# Patient Record
Sex: MALE | Race: OTHER | HISPANIC OR LATINO
[De-identification: names, ages, dates, MRNs, and addresses within clinical notes are randomized per-mention and may not be internally consistent; named-entity substitution may affect disease eponyms.]

---

## 2017-06-29 ENCOUNTER — APPOINTMENT (OUTPATIENT)
Dept: PULMONOLOGY | Facility: CLINIC | Age: 52
End: 2017-06-29

## 2017-06-29 VITALS
HEIGHT: 66 IN | HEART RATE: 71 BPM | DIASTOLIC BLOOD PRESSURE: 80 MMHG | WEIGHT: 243 LBS | BODY MASS INDEX: 39.05 KG/M2 | SYSTOLIC BLOOD PRESSURE: 140 MMHG | OXYGEN SATURATION: 96 %

## 2017-06-29 DIAGNOSIS — M54.31 SCIATICA, RIGHT SIDE: ICD-10-CM

## 2017-06-29 DIAGNOSIS — G47.33 OBSTRUCTIVE SLEEP APNEA (ADULT) (PEDIATRIC): ICD-10-CM

## 2017-06-29 DIAGNOSIS — E66.9 OBESITY, UNSPECIFIED: ICD-10-CM

## 2020-04-16 ENCOUNTER — INPATIENT (INPATIENT)
Facility: HOSPITAL | Age: 55
LOS: 5 days | Discharge: HOME | DRG: 177 | End: 2020-04-22
Attending: INTERNAL MEDICINE | Admitting: HOSPITALIST
Payer: MEDICAID

## 2020-04-16 VITALS
HEIGHT: 66 IN | SYSTOLIC BLOOD PRESSURE: 117 MMHG | RESPIRATION RATE: 20 BRPM | WEIGHT: 250 LBS | TEMPERATURE: 99 F | OXYGEN SATURATION: 93 % | HEART RATE: 81 BPM | DIASTOLIC BLOOD PRESSURE: 69 MMHG

## 2020-04-16 DIAGNOSIS — U07.1 COVID-19: ICD-10-CM

## 2020-04-16 DIAGNOSIS — R09.02 HYPOXEMIA: ICD-10-CM

## 2020-04-16 DIAGNOSIS — E87.1 HYPO-OSMOLALITY AND HYPONATREMIA: ICD-10-CM

## 2020-04-16 LAB
ALBUMIN SERPL ELPH-MCNC: 3.5 G/DL — SIGNIFICANT CHANGE UP (ref 3.3–5.2)
ALP SERPL-CCNC: 67 U/L — SIGNIFICANT CHANGE UP (ref 40–120)
ALT FLD-CCNC: 32 U/L — SIGNIFICANT CHANGE UP
ANION GAP SERPL CALC-SCNC: 12 MMOL/L — SIGNIFICANT CHANGE UP (ref 5–17)
AST SERPL-CCNC: 37 U/L — SIGNIFICANT CHANGE UP
BASOPHILS # BLD AUTO: 0.01 K/UL — SIGNIFICANT CHANGE UP (ref 0–0.2)
BASOPHILS NFR BLD AUTO: 0.2 % — SIGNIFICANT CHANGE UP (ref 0–2)
BILIRUB SERPL-MCNC: 0.5 MG/DL — SIGNIFICANT CHANGE UP (ref 0.4–2)
BUN SERPL-MCNC: 14 MG/DL — SIGNIFICANT CHANGE UP (ref 8–20)
CALCIUM SERPL-MCNC: 8.3 MG/DL — LOW (ref 8.6–10.2)
CHLORIDE SERPL-SCNC: 90 MMOL/L — LOW (ref 98–107)
CO2 SERPL-SCNC: 26 MMOL/L — SIGNIFICANT CHANGE UP (ref 22–29)
CREAT SERPL-MCNC: 0.71 MG/DL — SIGNIFICANT CHANGE UP (ref 0.5–1.3)
CRP SERPL-MCNC: 8.12 MG/DL — HIGH (ref 0–0.4)
EOSINOPHIL # BLD AUTO: 0 K/UL — SIGNIFICANT CHANGE UP (ref 0–0.5)
EOSINOPHIL NFR BLD AUTO: 0 % — SIGNIFICANT CHANGE UP (ref 0–6)
FERRITIN SERPL-MCNC: 658 NG/ML — HIGH (ref 30–400)
GLUCOSE SERPL-MCNC: 106 MG/DL — HIGH (ref 70–99)
HCT VFR BLD CALC: 38.5 % — LOW (ref 39–50)
HGB BLD-MCNC: 13.4 G/DL — SIGNIFICANT CHANGE UP (ref 13–17)
IMM GRANULOCYTES NFR BLD AUTO: 0.3 % — SIGNIFICANT CHANGE UP (ref 0–1.5)
LDH SERPL L TO P-CCNC: 329 U/L — HIGH (ref 98–192)
LYMPHOCYTES # BLD AUTO: 1.25 K/UL — SIGNIFICANT CHANGE UP (ref 1–3.3)
LYMPHOCYTES # BLD AUTO: 19.8 % — SIGNIFICANT CHANGE UP (ref 13–44)
MCHC RBC-ENTMCNC: 29.6 PG — SIGNIFICANT CHANGE UP (ref 27–34)
MCHC RBC-ENTMCNC: 34.8 GM/DL — SIGNIFICANT CHANGE UP (ref 32–36)
MCV RBC AUTO: 85.2 FL — SIGNIFICANT CHANGE UP (ref 80–100)
MONOCYTES # BLD AUTO: 0.5 K/UL — SIGNIFICANT CHANGE UP (ref 0–0.9)
MONOCYTES NFR BLD AUTO: 7.9 % — SIGNIFICANT CHANGE UP (ref 2–14)
NEUTROPHILS # BLD AUTO: 4.53 K/UL — SIGNIFICANT CHANGE UP (ref 1.8–7.4)
NEUTROPHILS NFR BLD AUTO: 71.8 % — SIGNIFICANT CHANGE UP (ref 43–77)
PLATELET # BLD AUTO: 223 K/UL — SIGNIFICANT CHANGE UP (ref 150–400)
POTASSIUM SERPL-MCNC: 4.1 MMOL/L — SIGNIFICANT CHANGE UP (ref 3.5–5.3)
POTASSIUM SERPL-SCNC: 4.1 MMOL/L — SIGNIFICANT CHANGE UP (ref 3.5–5.3)
PROCALCITONIN SERPL-MCNC: 0.12 NG/ML — HIGH (ref 0.02–0.1)
PROT SERPL-MCNC: 6.7 G/DL — SIGNIFICANT CHANGE UP (ref 6.6–8.7)
RBC # BLD: 4.52 M/UL — SIGNIFICANT CHANGE UP (ref 4.2–5.8)
RBC # FLD: 12.7 % — SIGNIFICANT CHANGE UP (ref 10.3–14.5)
SARS-COV-2 RNA SPEC QL NAA+PROBE: DETECTED
SODIUM SERPL-SCNC: 128 MMOL/L — LOW (ref 135–145)
WBC # BLD: 6.31 K/UL — SIGNIFICANT CHANGE UP (ref 3.8–10.5)
WBC # FLD AUTO: 6.31 K/UL — SIGNIFICANT CHANGE UP (ref 3.8–10.5)

## 2020-04-16 PROCEDURE — 93010 ELECTROCARDIOGRAM REPORT: CPT

## 2020-04-16 PROCEDURE — 71045 X-RAY EXAM CHEST 1 VIEW: CPT | Mod: 26

## 2020-04-16 PROCEDURE — 99223 1ST HOSP IP/OBS HIGH 75: CPT

## 2020-04-16 PROCEDURE — 99285 EMERGENCY DEPT VISIT HI MDM: CPT

## 2020-04-16 RX ORDER — ALBUTEROL 90 UG/1
2 AEROSOL, METERED ORAL EVERY 4 HOURS
Refills: 0 | Status: DISCONTINUED | OUTPATIENT
Start: 2020-04-16 | End: 2020-04-22

## 2020-04-16 RX ORDER — HYDROXYCHLOROQUINE SULFATE 200 MG
800 TABLET ORAL EVERY 24 HOURS
Refills: 0 | Status: COMPLETED | OUTPATIENT
Start: 2020-04-16 | End: 2020-04-16

## 2020-04-16 RX ORDER — HYDROXYCHLOROQUINE SULFATE 200 MG
TABLET ORAL
Refills: 0 | Status: COMPLETED | OUTPATIENT
Start: 2020-04-16 | End: 2020-04-20

## 2020-04-16 RX ORDER — ENOXAPARIN SODIUM 100 MG/ML
40 INJECTION SUBCUTANEOUS EVERY 12 HOURS
Refills: 0 | Status: DISCONTINUED | OUTPATIENT
Start: 2020-04-16 | End: 2020-04-22

## 2020-04-16 RX ORDER — SODIUM CHLORIDE 9 MG/ML
3 INJECTION INTRAMUSCULAR; INTRAVENOUS; SUBCUTANEOUS EVERY 8 HOURS
Refills: 0 | Status: DISCONTINUED | OUTPATIENT
Start: 2020-04-16 | End: 2020-04-22

## 2020-04-16 RX ORDER — LANOLIN ALCOHOL/MO/W.PET/CERES
3 CREAM (GRAM) TOPICAL AT BEDTIME
Refills: 0 | Status: DISCONTINUED | OUTPATIENT
Start: 2020-04-16 | End: 2020-04-22

## 2020-04-16 RX ORDER — ONDANSETRON 8 MG/1
4 TABLET, FILM COATED ORAL EVERY 6 HOURS
Refills: 0 | Status: DISCONTINUED | OUTPATIENT
Start: 2020-04-16 | End: 2020-04-22

## 2020-04-16 RX ORDER — SODIUM CHLORIDE 9 MG/ML
1000 INJECTION INTRAMUSCULAR; INTRAVENOUS; SUBCUTANEOUS
Refills: 0 | Status: DISCONTINUED | OUTPATIENT
Start: 2020-04-16 | End: 2020-04-22

## 2020-04-16 RX ORDER — HYDROXYCHLOROQUINE SULFATE 200 MG
400 TABLET ORAL EVERY 24 HOURS
Refills: 0 | Status: COMPLETED | OUTPATIENT
Start: 2020-04-17 | End: 2020-04-20

## 2020-04-16 RX ORDER — ACETAMINOPHEN 500 MG
650 TABLET ORAL EVERY 4 HOURS
Refills: 0 | Status: DISCONTINUED | OUTPATIENT
Start: 2020-04-16 | End: 2020-04-22

## 2020-04-16 RX ADMIN — SODIUM CHLORIDE 3 MILLILITER(S): 9 INJECTION INTRAMUSCULAR; INTRAVENOUS; SUBCUTANEOUS at 21:48

## 2020-04-16 NOTE — H&P ADULT - ASSESSMENT
54 y/o male with acute hypoxemic respiratory failure due to COVID viral PNA, hypovolemic hyponatremia, Dehydration

## 2020-04-16 NOTE — ED PROVIDER NOTE - PATIENT PORTAL LINK FT
You can access the FollowMyHealth Patient Portal offered by [facility] by registering at the following website: [email or web address]. By joining [facility]’s FollowMyHealth portal, you will also be able to view your health information using other applications (apps) compatible with our system.

## 2020-04-16 NOTE — ED PROVIDER NOTE - NS ED ROS FT
+ fever/chills, No photophobia/eye pain/changes in vision, No ear pain/sore throat/dysphagia, No chest pain/palpitations, +SOB/cough no wheeze/stridor, No abdominal pain, No N/V/D, no dysuria/frequency/discharge, No neck/back pain, no rash, no changes in neurological status/function.

## 2020-04-16 NOTE — ED ADULT NURSE NOTE - NSIMPLEMENTINTERV_GEN_ALL_ED
Implemented All Universal Safety Interventions:  [place] to call system. Call bell, personal items and telephone within reach. Instruct patient to call for assistance. Room bathroom lighting operational. Non-slip footwear when patient is off stretcher. Physically safe environment: no spills, clutter or unnecessary equipment. Stretcher in lowest position, wheels locked, appropriate side rails in place.

## 2020-04-16 NOTE — H&P ADULT - PROBLEM SELECTOR PLAN 3
Likely hypovolemic hyponatremia based on increased insensible fluid loss from fever/COVD PNA. Avoid excess fluids with risk of ARDS. Place on NS for 12 hours and repeat BMP in AM

## 2020-04-16 NOTE — ED PROVIDER NOTE - CLINICAL SUMMARY MEDICAL DECISION MAKING FREE TEXT BOX
labs and imaging reviewed.  Pt not hypoxic in ER.  Sat >92% at rest and on exertion stays above 92%.  Pt not requiring oxygen. Pt well-appearing with no acute distress. Pt instructed to return for worsening sob, cp, or any other concerns.  Pt given a copy of all results and instructed to f/up with pcp regarding any abnormal results. labs and imaging reviewed.  Pt hypoxic to 90% and mildly tachypneic sitting in bed. will admit for further management. labs and imaging reviewed.  Pt hypoxic to 90% and mildly tachypneic sitting in bed. will admit for further management.    Case d/w Dr. Blandon who will admit for further management.

## 2020-04-16 NOTE — ED ADULT NURSE NOTE - OBJECTIVE STATEMENT
pt a&ox3 c/o difficulty breathing worsening yesterday. +body aches. +covid19 contacts pt wife and mother-in-law are both positive. isolation maintained, saO2 93% RA. pt able to ambulate with steady gait noted. RR 20 and no s/s labored breathing.

## 2020-04-16 NOTE — ED PROVIDER NOTE - NSFOLLOWUPINSTRUCTIONS_ED_ALL_ED_FT
KAYLA TODAS LAS INSTRUCCIONES ADJUNTAS PARA CORONAVIRUS INMEDIATAMENTE   - Se le realizaron pruebas para detectar COVID-19 (Coronavirus) javi toney visita al Departamento de Emergencias.   - No vuelva al trabajo/escuela/áreas públicas/transporte público hasta que haya dado negativo para COVID-19.  - Los resultados estarán disponibles en 5-7 días. Autocuarentena hasta que los resultados de COVID-19 estén disponibles.  - Supervise deandre síntomas utilizando el rastreador de síntomas.  - Practicar el distanciamiento social y evitar el contacto con los demás. Evite compartir artículos personales para el hogar.   - Practicar la higiene adecuada de las michael. Desinfectar superficies con frecuencia. Cúbrase la tos y estornude.   - Manténgase hidratado.      BUSCA ATENCIÓN MÉDICA INMEDIATA SI TIENES CUALQUIERA DE LOS SIGUIENTES SÍNTOMAS  **Busca atención médica inmediata si desarrollas nuevos/empeoramiento, signos/síntomas o preocupaciones, incluyendo, thao no limitado a dificultad para respirar, dificultad para respirar, dolor en el pecho, confusión, debilidad severa.**    Si dante que está experimentando michelle emergencia médica llame al 9-1-1.    READ ALL ATTACHED INSTRUCTIONS FOR CORONAVIRUS IMMEDIATELY   - You were tested for COVID-19 (Coronavirus) during your visit to Emergency Department.   - Do not return to work/school/public areas/public transit until you have tested negative for COVID-19.  - Results will be available in 5-7 days. Self quarantine until COVID-19 results available.  - Monitor your symptoms using symptom tracker.  - Practice social distancing and avoid contact with others. Avoid sharing personal household items.   - Practice proper hand hygiene. Disinfect surfaces frequently. Cover your coughs and sneezes.   - Stay hydrated.      SEEK IMMEDIATE MEDICAL CARE IF YOU HAVE ANY OF THE FOLLOWING SYMPTOMS  **Seek immediate medicate attention if you develop new/worsening, signs/symptoms or concerns including, but not limited to shortness of breath, difficulty breathing, chest pain, confusion, severe weakness.**    If you believe you are experiencing a medical emergency call 9-1-1.

## 2020-04-16 NOTE — ED ADULT NURSE REASSESSMENT NOTE - NS ED NURSE REASSESS COMMENT FT1
report given to 6T RN at this time, pt remains on 2L 02 via nasal cannula. AOX3, no distress. even and unlabored resps present.

## 2020-04-16 NOTE — H&P ADULT - HISTORY OF PRESENT ILLNESS
Pt is a 54 yo M co fever, cough, body aches and sob. Pt states that the symptoms started 1 week ago. Pt states that he has had worsening sob. Pt states that he sleeps on his belly at night and last night woke up short of breath and checked his pulse ox and it was 88%. Pt states that his wife and mother-in-law were both covid positive.  no cp. no n/v. no diarrhea. no other complaints. Denies skin rash, leg edema, no hx of lung disease. In ED noted to have CXR with b/l intersitial infiltrates, COVID swab positive.

## 2020-04-16 NOTE — H&P ADULT - PROBLEM SELECTOR PLAN 1
Admit to medical bed with Q 4 hour pulse ox, Plaquenil taper, spirometer supplemental 02, prone as tolerated, MICU consult if unable to maintain saturation on NRB, prn tylenol, trend fever, avoid unnecessary blood draws. DVT-P, OOB ambulate

## 2020-04-16 NOTE — ED PROVIDER NOTE - OBJECTIVE STATEMENT
Pt is a 54 yo M co fever, cough, body aches and sob. Pt states that the symptoms started 1 week ago. Pt states that he has had worsening sob. Pt states that he sleeps on his belly at night and last night woke up short of breath and checked his pulse ox and it was 88%. Pt states that his wife and mother-in-law were both covid positive.  no cp. no n/v. no diarrhea. no other complaints.

## 2020-04-16 NOTE — ED ADULT NURSE REASSESSMENT NOTE - NS ED NURSE REASSESS COMMENT FT1
upon attempt to DC, pt with 02 sat below 90%, pt stating he si more SOB than earlier today. ER MD aware and at bedside for re-eval.

## 2020-04-17 LAB
ANION GAP SERPL CALC-SCNC: 12 MMOL/L — SIGNIFICANT CHANGE UP (ref 5–17)
BUN SERPL-MCNC: 11 MG/DL — SIGNIFICANT CHANGE UP (ref 8–20)
CALCIUM SERPL-MCNC: 8.1 MG/DL — LOW (ref 8.6–10.2)
CHLORIDE SERPL-SCNC: 88 MMOL/L — LOW (ref 98–107)
CO2 SERPL-SCNC: 26 MMOL/L — SIGNIFICANT CHANGE UP (ref 22–29)
CREAT SERPL-MCNC: 0.66 MG/DL — SIGNIFICANT CHANGE UP (ref 0.5–1.3)
GLUCOSE SERPL-MCNC: 90 MG/DL — SIGNIFICANT CHANGE UP (ref 70–99)
HCV AB S/CO SERPL IA: 0.09 S/CO — SIGNIFICANT CHANGE UP (ref 0–0.99)
HCV AB SERPL-IMP: SIGNIFICANT CHANGE UP
POTASSIUM SERPL-MCNC: 3.8 MMOL/L — SIGNIFICANT CHANGE UP (ref 3.5–5.3)
POTASSIUM SERPL-SCNC: 3.8 MMOL/L — SIGNIFICANT CHANGE UP (ref 3.5–5.3)
SODIUM SERPL-SCNC: 126 MMOL/L — LOW (ref 135–145)

## 2020-04-17 RX ADMIN — Medication 400 MILLIGRAM(S): at 21:41

## 2020-04-17 RX ADMIN — SODIUM CHLORIDE 3 MILLILITER(S): 9 INJECTION INTRAMUSCULAR; INTRAVENOUS; SUBCUTANEOUS at 04:07

## 2020-04-17 RX ADMIN — SODIUM CHLORIDE 3 MILLILITER(S): 9 INJECTION INTRAMUSCULAR; INTRAVENOUS; SUBCUTANEOUS at 21:40

## 2020-04-17 RX ADMIN — SODIUM CHLORIDE 125 MILLILITER(S): 9 INJECTION INTRAMUSCULAR; INTRAVENOUS; SUBCUTANEOUS at 00:07

## 2020-04-17 RX ADMIN — ENOXAPARIN SODIUM 40 MILLIGRAM(S): 100 INJECTION SUBCUTANEOUS at 17:24

## 2020-04-17 RX ADMIN — Medication 800 MILLIGRAM(S): at 00:07

## 2020-04-17 RX ADMIN — ENOXAPARIN SODIUM 40 MILLIGRAM(S): 100 INJECTION SUBCUTANEOUS at 04:08

## 2020-04-17 RX ADMIN — SODIUM CHLORIDE 3 MILLILITER(S): 9 INJECTION INTRAMUSCULAR; INTRAVENOUS; SUBCUTANEOUS at 13:01

## 2020-04-17 NOTE — PROGRESS NOTE ADULT - ASSESSMENT
Beginning to respond to medications:Receiving Lovenox and Plaquenil.    Plan: To continue current meds.

## 2020-04-17 NOTE — PROGRESS NOTE ADULT - SUBJECTIVE AND OBJECTIVE BOX
Patient seen and examined and EMR reviewed.   Admitted with Fever, cough, SOB.   Patient is able to give good history.    COVID 19 posiyive with bilateral infiltrates on CxR.  O2 sat is good on nasal cannula.

## 2020-04-18 PROCEDURE — 71045 X-RAY EXAM CHEST 1 VIEW: CPT | Mod: 26

## 2020-04-18 RX ADMIN — SODIUM CHLORIDE 3 MILLILITER(S): 9 INJECTION INTRAMUSCULAR; INTRAVENOUS; SUBCUTANEOUS at 11:22

## 2020-04-18 RX ADMIN — SODIUM CHLORIDE 3 MILLILITER(S): 9 INJECTION INTRAMUSCULAR; INTRAVENOUS; SUBCUTANEOUS at 19:48

## 2020-04-18 RX ADMIN — ENOXAPARIN SODIUM 40 MILLIGRAM(S): 100 INJECTION SUBCUTANEOUS at 04:31

## 2020-04-18 RX ADMIN — Medication 400 MILLIGRAM(S): at 20:17

## 2020-04-18 RX ADMIN — ENOXAPARIN SODIUM 40 MILLIGRAM(S): 100 INJECTION SUBCUTANEOUS at 18:30

## 2020-04-18 RX ADMIN — Medication 650 MILLIGRAM(S): at 17:15

## 2020-04-18 RX ADMIN — SODIUM CHLORIDE 3 MILLILITER(S): 9 INJECTION INTRAMUSCULAR; INTRAVENOUS; SUBCUTANEOUS at 04:24

## 2020-04-18 NOTE — PROGRESS NOTE ADULT - SUBJECTIVE AND OBJECTIVE BOX
Recent temp above 103. Slightly worse hyponatremia.   NP progress note appreciated. I agree with the orders and Plan as outlined.   Repeat labs for tomorrow.

## 2020-04-18 NOTE — PROGRESS NOTE ADULT - ASSESSMENT
56 y/o male with acute hypoxemic respiratory failure due to COVID viral PNA, & hyponatremia    1. Acute hypoxemic respiratory failure due to COVID-19 PNA.   Bilateral patchy infiltrates on admission CXR. Known COVID positive contacts in home. Complains of feeling mildly short of breath. O2 sats stable on 2LNC supplemental O2. Inflammatory markers elevated on admission.     -Q4H pulse ox checks  -Wean O2 as tolerated  -IS Q1H while awake   -Continue Plaquenil taper, QTc 417 on admission   -Prone as tolerated   -Tylenol PRN fever/pain.  -Continue to trend inflammatory markers if resp status worsens          2. Hyponatremia   Mild. Asymptomatic. Serum Na 128 -->126 s/p IV fluid overnight. Has had intermittent fevers. Does not appear to be on home diuretics. Renal fx WNL.    -Will check urine studies (Na & Osmo)  -I&O Q4H  -BMP in AM    DVT Prophy - Lovenox BID

## 2020-04-18 NOTE — PROGRESS NOTE ADULT - SUBJECTIVE AND OBJECTIVE BOX
Medicine Progress Note    Patient is a 55y old  Male who presents with a chief complaint of COVID PNA  Hypoxia  Hyponatremia (17 Apr 2020 19:46)      SUBJECTIVE / OVERNIGHT EVENTS:      MEDICATIONS  (STANDING):  enoxaparin Injectable 40 milliGRAM(s) SubCutaneous every 12 hours  hydroxychloroquine   Oral   hydroxychloroquine 400 milliGRAM(s) Oral every 24 hours  sodium chloride 0.9% lock flush 3 milliLiter(s) IV Push every 8 hours  sodium chloride 0.9%. 1000 milliLiter(s) (125 mL/Hr) IV Continuous <Continuous>    MEDICATIONS  (PRN):  acetaminophen   Tablet .. 650 milliGRAM(s) Oral every 4 hours PRN Temp greater or equal to 38.5C (101.3F)  ALBUTerol    90 MICROgram(s) HFA Inhaler 2 Puff(s) Inhalation every 4 hours PRN Shortness of Breath and/or Wheezing  benzonatate 100 milliGRAM(s) Oral three times a day PRN Cough  melatonin 3 milliGRAM(s) Oral at bedtime PRN Insomnia  ondansetron Injectable 4 milliGRAM(s) IV Push every 6 hours PRN Nausea and/or Vomiting      I&O's Summary    17 Apr 2020 07:01  -  18 Apr 2020 07:00  --------------------------------------------------------  IN: 625 mL / OUT: 0 mL / NET: 625 mL        PHYSICAL EXAM:  Vital Signs Last 24 Hrs  T(C): 39.9 (18 Apr 2020 16:32), Max: 39.9 (18 Apr 2020 16:32)  T(F): 103.8 (18 Apr 2020 16:32), Max: 103.8 (18 Apr 2020 16:32)  HR: 75 (18 Apr 2020 16:32) (75 - 77)  BP: 127/70 (18 Apr 2020 16:32) (127/70 - 139/69)  BP(mean): --  RR: 19 (18 Apr 2020 16:32) (18 - 19)  SpO2: 93% (18 Apr 2020 16:32) (93% - 95%)    CONSTITUTIONAL: NAD, well-developed, well-groomed  RESPIRATORY: Normal respiratory effort;   CARDIOVASCULAR: Regular rate and rhythm, No lower extremity edema; Peripheral pulses are 2+ bilaterally  ABDOMEN: Nontender to palpation, normoactive bowel sounds, no rebound/guarding; No hepatosplenomegaly  PSYCH: A+O to person, place, and time; affect appropriate  SKIN: No rashes; no palpable lesions    LABS:                        13.4   6.31  )-----------( 223      ( 16 Apr 2020 17:52 )             38.5     04-17    126<L>  |  88<L>  |  11.0  ----------------------------<  90  3.8   |  26.0  |  0.66    Ca    8.1<L>      17 Apr 2020 08:58    TPro  6.7  /  Alb  3.5  /  TBili  0.5  /  DBili  x   /  AST  37  /  ALT  32  /  AlkPhos  67  04-16      COVID-19 PCR: Detected (16 Apr 2020 17:55) Medicine Progress Note    Patient is a 55y old  Male who presents with a chief complaint of COVID PNA  Hypoxia  Hyponatremia (17 Apr 2020 19:46)      SUBJECTIVE / OVERNIGHT EVENTS: Stable O2 requirements overnight. Continue Plaquenil. Serum Na 126 this AM, sending urine studies, strict I&O      MEDICATIONS  (STANDING):  enoxaparin Injectable 40 milliGRAM(s) SubCutaneous every 12 hours  hydroxychloroquine   Oral   hydroxychloroquine 400 milliGRAM(s) Oral every 24 hours  sodium chloride 0.9% lock flush 3 milliLiter(s) IV Push every 8 hours  sodium chloride 0.9%. 1000 milliLiter(s) (125 mL/Hr) IV Continuous <Continuous>    MEDICATIONS  (PRN):  acetaminophen   Tablet .. 650 milliGRAM(s) Oral every 4 hours PRN Temp greater or equal to 38.5C (101.3F)  ALBUTerol    90 MICROgram(s) HFA Inhaler 2 Puff(s) Inhalation every 4 hours PRN Shortness of Breath and/or Wheezing  benzonatate 100 milliGRAM(s) Oral three times a day PRN Cough  melatonin 3 milliGRAM(s) Oral at bedtime PRN Insomnia  ondansetron Injectable 4 milliGRAM(s) IV Push every 6 hours PRN Nausea and/or Vomiting      I&O's Summary    17 Apr 2020 07:01  -  18 Apr 2020 07:00  --------------------------------------------------------  IN: 625 mL / OUT: 0 mL / NET: 625 mL        PHYSICAL EXAM:  Vital Signs Last 24 Hrs  T(C): 39.9 (18 Apr 2020 16:32), Max: 39.9 (18 Apr 2020 16:32)  T(F): 103.8 (18 Apr 2020 16:32), Max: 103.8 (18 Apr 2020 16:32)  HR: 75 (18 Apr 2020 16:32) (75 - 77)  BP: 127/70 (18 Apr 2020 16:32) (127/70 - 139/69)  BP(mean): --  RR: 19 (18 Apr 2020 16:32) (18 - 19)  SpO2: 93% (18 Apr 2020 16:32) (93% - 95%)    CONSTITUTIONAL: NAD, well-developed, well-groomed  RESPIRATORY: Normal respiratory effort;   CARDIOVASCULAR: Regular rate and rhythm, No lower extremity edema; Peripheral pulses are 2+ bilaterally  ABDOMEN: Nontender to palpation, normoactive bowel sounds, no rebound/guarding; No hepatosplenomegaly  PSYCH: A+O to person, place, and time; affect appropriate  SKIN: No rashes; no palpable lesions    LABS:                        13.4   6.31  )-----------( 223      ( 16 Apr 2020 17:52 )             38.5     04-17    126<L>  |  88<L>  |  11.0  ----------------------------<  90  3.8   |  26.0  |  0.66    Ca    8.1<L>      17 Apr 2020 08:58    TPro  6.7  /  Alb  3.5  /  TBili  0.5  /  DBili  x   /  AST  37  /  ALT  32  /  AlkPhos  67  04-16      COVID-19 PCR: Detected (16 Apr 2020 17:55)

## 2020-04-19 LAB
ALBUMIN SERPL ELPH-MCNC: 3.1 G/DL — LOW (ref 3.3–5.2)
ALP SERPL-CCNC: 87 U/L — SIGNIFICANT CHANGE UP (ref 40–120)
ALT FLD-CCNC: 55 U/L — HIGH
ANION GAP SERPL CALC-SCNC: 12 MMOL/L — SIGNIFICANT CHANGE UP (ref 5–17)
ANION GAP SERPL CALC-SCNC: 14 MMOL/L — SIGNIFICANT CHANGE UP (ref 5–17)
AST SERPL-CCNC: 57 U/L — HIGH
BILIRUB SERPL-MCNC: 0.6 MG/DL — SIGNIFICANT CHANGE UP (ref 0.4–2)
BUN SERPL-MCNC: 6 MG/DL — LOW (ref 8–20)
BUN SERPL-MCNC: 9 MG/DL — SIGNIFICANT CHANGE UP (ref 8–20)
CALCIUM SERPL-MCNC: 7.9 MG/DL — LOW (ref 8.6–10.2)
CALCIUM SERPL-MCNC: 8.2 MG/DL — LOW (ref 8.6–10.2)
CHLORIDE SERPL-SCNC: 85 MMOL/L — LOW (ref 98–107)
CHLORIDE SERPL-SCNC: 89 MMOL/L — LOW (ref 98–107)
CO2 SERPL-SCNC: 26 MMOL/L — SIGNIFICANT CHANGE UP (ref 22–29)
CO2 SERPL-SCNC: 29 MMOL/L — SIGNIFICANT CHANGE UP (ref 22–29)
CREAT SERPL-MCNC: 0.59 MG/DL — SIGNIFICANT CHANGE UP (ref 0.5–1.3)
CREAT SERPL-MCNC: 0.63 MG/DL — SIGNIFICANT CHANGE UP (ref 0.5–1.3)
CRP SERPL-MCNC: 14.49 MG/DL — HIGH (ref 0–0.4)
CRP SERPL-MCNC: 15.33 MG/DL — HIGH (ref 0–0.4)
D DIMER BLD IA.RAPID-MCNC: 213 NG/ML DDU — SIGNIFICANT CHANGE UP
FERRITIN SERPL-MCNC: 775 NG/ML — HIGH (ref 30–400)
GLUCOSE SERPL-MCNC: 109 MG/DL — HIGH (ref 70–99)
GLUCOSE SERPL-MCNC: 94 MG/DL — SIGNIFICANT CHANGE UP (ref 70–99)
HCT VFR BLD CALC: 37 % — LOW (ref 39–50)
HGB BLD-MCNC: 12.6 G/DL — LOW (ref 13–17)
MCHC RBC-ENTMCNC: 29.1 PG — SIGNIFICANT CHANGE UP (ref 27–34)
MCHC RBC-ENTMCNC: 34.1 GM/DL — SIGNIFICANT CHANGE UP (ref 32–36)
MCV RBC AUTO: 85.5 FL — SIGNIFICANT CHANGE UP (ref 80–100)
OSMOLALITY SERPL: 275 MOSMOL/KG — SIGNIFICANT CHANGE UP (ref 275–300)
OSMOLALITY UR: 464 MOSM/KG — SIGNIFICANT CHANGE UP (ref 300–1000)
PLATELET # BLD AUTO: 279 K/UL — SIGNIFICANT CHANGE UP (ref 150–400)
POTASSIUM SERPL-MCNC: 3.7 MMOL/L — SIGNIFICANT CHANGE UP (ref 3.5–5.3)
POTASSIUM SERPL-MCNC: 3.9 MMOL/L — SIGNIFICANT CHANGE UP (ref 3.5–5.3)
POTASSIUM SERPL-SCNC: 3.7 MMOL/L — SIGNIFICANT CHANGE UP (ref 3.5–5.3)
POTASSIUM SERPL-SCNC: 3.9 MMOL/L — SIGNIFICANT CHANGE UP (ref 3.5–5.3)
PROCALCITONIN SERPL-MCNC: 0.19 NG/ML — HIGH (ref 0.02–0.1)
PROCALCITONIN SERPL-MCNC: 0.21 NG/ML — HIGH (ref 0.02–0.1)
PROT SERPL-MCNC: 6.4 G/DL — LOW (ref 6.6–8.7)
RBC # BLD: 4.33 M/UL — SIGNIFICANT CHANGE UP (ref 4.2–5.8)
RBC # FLD: 12.6 % — SIGNIFICANT CHANGE UP (ref 10.3–14.5)
SODIUM SERPL-SCNC: 123 MMOL/L — LOW (ref 135–145)
SODIUM SERPL-SCNC: 132 MMOL/L — LOW (ref 135–145)
SODIUM UR-SCNC: 88 MMOL/L — SIGNIFICANT CHANGE UP
WBC # BLD: 10.34 K/UL — SIGNIFICANT CHANGE UP (ref 3.8–10.5)
WBC # FLD AUTO: 10.34 K/UL — SIGNIFICANT CHANGE UP (ref 3.8–10.5)

## 2020-04-19 PROCEDURE — 71045 X-RAY EXAM CHEST 1 VIEW: CPT | Mod: 26

## 2020-04-19 RX ADMIN — SODIUM CHLORIDE 3 MILLILITER(S): 9 INJECTION INTRAMUSCULAR; INTRAVENOUS; SUBCUTANEOUS at 03:11

## 2020-04-19 RX ADMIN — ENOXAPARIN SODIUM 40 MILLIGRAM(S): 100 INJECTION SUBCUTANEOUS at 04:00

## 2020-04-19 RX ADMIN — Medication 650 MILLIGRAM(S): at 00:11

## 2020-04-19 RX ADMIN — ENOXAPARIN SODIUM 40 MILLIGRAM(S): 100 INJECTION SUBCUTANEOUS at 16:45

## 2020-04-19 RX ADMIN — SODIUM CHLORIDE 3 MILLILITER(S): 9 INJECTION INTRAMUSCULAR; INTRAVENOUS; SUBCUTANEOUS at 12:17

## 2020-04-19 RX ADMIN — SODIUM CHLORIDE 3 MILLILITER(S): 9 INJECTION INTRAMUSCULAR; INTRAVENOUS; SUBCUTANEOUS at 20:32

## 2020-04-19 RX ADMIN — Medication 400 MILLIGRAM(S): at 20:45

## 2020-04-19 NOTE — PROGRESS NOTE ADULT - SUBJECTIVE AND OBJECTIVE BOX
VS stable. Low grade temps. Saturation good on Nasal Cannula.  Lungs clear Heart RRR.    Sodium improved.   Markers still elevated.  Bilateral infiltrates on CxR.

## 2020-04-20 LAB
FERRITIN SERPL-MCNC: 941 NG/ML — HIGH (ref 30–400)
HCT VFR BLD CALC: 36.4 % — LOW (ref 39–50)
HGB BLD-MCNC: 12.4 G/DL — LOW (ref 13–17)
LDH SERPL L TO P-CCNC: 380 U/L — HIGH (ref 98–192)
MCHC RBC-ENTMCNC: 29 PG — SIGNIFICANT CHANGE UP (ref 27–34)
MCHC RBC-ENTMCNC: 34.1 GM/DL — SIGNIFICANT CHANGE UP (ref 32–36)
MCV RBC AUTO: 85 FL — SIGNIFICANT CHANGE UP (ref 80–100)
PLATELET # BLD AUTO: 324 K/UL — SIGNIFICANT CHANGE UP (ref 150–400)
RBC # BLD: 4.28 M/UL — SIGNIFICANT CHANGE UP (ref 4.2–5.8)
RBC # FLD: 12.7 % — SIGNIFICANT CHANGE UP (ref 10.3–14.5)
WBC # BLD: 8.49 K/UL — SIGNIFICANT CHANGE UP (ref 3.8–10.5)
WBC # FLD AUTO: 8.49 K/UL — SIGNIFICANT CHANGE UP (ref 3.8–10.5)

## 2020-04-20 RX ADMIN — SODIUM CHLORIDE 3 MILLILITER(S): 9 INJECTION INTRAMUSCULAR; INTRAVENOUS; SUBCUTANEOUS at 12:04

## 2020-04-20 RX ADMIN — SODIUM CHLORIDE 3 MILLILITER(S): 9 INJECTION INTRAMUSCULAR; INTRAVENOUS; SUBCUTANEOUS at 20:41

## 2020-04-20 RX ADMIN — ENOXAPARIN SODIUM 40 MILLIGRAM(S): 100 INJECTION SUBCUTANEOUS at 17:03

## 2020-04-20 RX ADMIN — Medication 400 MILLIGRAM(S): at 20:47

## 2020-04-20 RX ADMIN — SODIUM CHLORIDE 3 MILLILITER(S): 9 INJECTION INTRAMUSCULAR; INTRAVENOUS; SUBCUTANEOUS at 04:32

## 2020-04-20 RX ADMIN — ENOXAPARIN SODIUM 40 MILLIGRAM(S): 100 INJECTION SUBCUTANEOUS at 04:32

## 2020-04-20 NOTE — PROGRESS NOTE ADULT - SUBJECTIVE AND OBJECTIVE BOX
VS stable, afebrile.  Breathing has improved. Appetite also improved  Oxygenation better on nasal cannula.    Hyponatremis again today: Na 123. Urine osm is 88  CxR yesterday with bilateral infiltrates.  He does not appear dehydrated.

## 2020-04-20 NOTE — PROGRESS NOTE ADULT - ASSESSMENT
Hyponatremia may be /2/2 SIADH.  Otherwise making progress.    Plan continue current meds. Avoid diuretics. Mild fliud restriction.  Re check CMP and CBC.

## 2020-04-20 NOTE — DIETITIAN INITIAL EVALUATION ADULT. - PERTINENT LABORATORY DATA
04-19 Na123 mmol/L<L> Glu 109 mg/dL<H> K+ 3.7 mmol/L Cr  0.59 mg/dL BUN 9.0 mg/dL Phos n/a   Alb 3.1 g/dL<L> PAB n/a

## 2020-04-20 NOTE — DIETITIAN INITIAL EVALUATION ADULT. - OTHER INFO
54yo male admitted with hypoxemia 2/2 COVID-19 and H/H hyponatremia. No significant PMHx. Pt eating well completing 100% of meals per documentation.

## 2020-04-21 LAB
ALBUMIN SERPL ELPH-MCNC: 3.3 G/DL — SIGNIFICANT CHANGE UP (ref 3.3–5.2)
ALP SERPL-CCNC: 93 U/L — SIGNIFICANT CHANGE UP (ref 40–120)
ALT FLD-CCNC: 84 U/L — HIGH
ANION GAP SERPL CALC-SCNC: 12 MMOL/L — SIGNIFICANT CHANGE UP (ref 5–17)
AST SERPL-CCNC: 54 U/L — HIGH
BILIRUB SERPL-MCNC: 0.5 MG/DL — SIGNIFICANT CHANGE UP (ref 0.4–2)
BUN SERPL-MCNC: 8 MG/DL — SIGNIFICANT CHANGE UP (ref 8–20)
CALCIUM SERPL-MCNC: 8.2 MG/DL — LOW (ref 8.6–10.2)
CHLORIDE SERPL-SCNC: 90 MMOL/L — LOW (ref 98–107)
CO2 SERPL-SCNC: 26 MMOL/L — SIGNIFICANT CHANGE UP (ref 22–29)
CREAT SERPL-MCNC: 0.48 MG/DL — LOW (ref 0.5–1.3)
GLUCOSE SERPL-MCNC: 99 MG/DL — SIGNIFICANT CHANGE UP (ref 70–99)
HCT VFR BLD CALC: 37.3 % — LOW (ref 39–50)
HGB BLD-MCNC: 12.8 G/DL — LOW (ref 13–17)
MCHC RBC-ENTMCNC: 29.2 PG — SIGNIFICANT CHANGE UP (ref 27–34)
MCHC RBC-ENTMCNC: 34.3 GM/DL — SIGNIFICANT CHANGE UP (ref 32–36)
MCV RBC AUTO: 85 FL — SIGNIFICANT CHANGE UP (ref 80–100)
OSMOLALITY UR: 334 MOSM/KG — SIGNIFICANT CHANGE UP (ref 300–1000)
PLATELET # BLD AUTO: 410 K/UL — HIGH (ref 150–400)
POTASSIUM SERPL-MCNC: 4.1 MMOL/L — SIGNIFICANT CHANGE UP (ref 3.5–5.3)
POTASSIUM SERPL-SCNC: 4.1 MMOL/L — SIGNIFICANT CHANGE UP (ref 3.5–5.3)
PROT SERPL-MCNC: 6.6 G/DL — SIGNIFICANT CHANGE UP (ref 6.6–8.7)
RBC # BLD: 4.39 M/UL — SIGNIFICANT CHANGE UP (ref 4.2–5.8)
RBC # FLD: 12.6 % — SIGNIFICANT CHANGE UP (ref 10.3–14.5)
SODIUM SERPL-SCNC: 128 MMOL/L — LOW (ref 135–145)
SODIUM UR-SCNC: 38 MMOL/L — SIGNIFICANT CHANGE UP
WBC # BLD: 7 K/UL — SIGNIFICANT CHANGE UP (ref 3.8–10.5)
WBC # FLD AUTO: 7 K/UL — SIGNIFICANT CHANGE UP (ref 3.8–10.5)

## 2020-04-21 RX ADMIN — ENOXAPARIN SODIUM 40 MILLIGRAM(S): 100 INJECTION SUBCUTANEOUS at 05:47

## 2020-04-21 RX ADMIN — ENOXAPARIN SODIUM 40 MILLIGRAM(S): 100 INJECTION SUBCUTANEOUS at 17:28

## 2020-04-21 RX ADMIN — SODIUM CHLORIDE 3 MILLILITER(S): 9 INJECTION INTRAMUSCULAR; INTRAVENOUS; SUBCUTANEOUS at 05:39

## 2020-04-21 RX ADMIN — SODIUM CHLORIDE 3 MILLILITER(S): 9 INJECTION INTRAMUSCULAR; INTRAVENOUS; SUBCUTANEOUS at 12:48

## 2020-04-21 RX ADMIN — SODIUM CHLORIDE 3 MILLILITER(S): 9 INJECTION INTRAMUSCULAR; INTRAVENOUS; SUBCUTANEOUS at 20:55

## 2020-04-21 NOTE — PROGRESS NOTE ADULT - SUBJECTIVE AND OBJECTIVE BOX
VS Stable and saturation improving on nasal cannula.  He still feels SOB    Lungs are clear Heart RRR    Awaiting repeat CMP to check on hyponatremia.

## 2020-04-21 NOTE — PROGRESS NOTE ADULT - REASON FOR ADMISSION
COVID PNA  Hypoxia  Hyponatremia

## 2020-04-22 ENCOUNTER — TRANSCRIPTION ENCOUNTER (OUTPATIENT)
Age: 55
End: 2020-04-22

## 2020-04-22 VITALS
OXYGEN SATURATION: 93 % | SYSTOLIC BLOOD PRESSURE: 116 MMHG | HEART RATE: 59 BPM | DIASTOLIC BLOOD PRESSURE: 72 MMHG | TEMPERATURE: 98 F | RESPIRATION RATE: 20 BRPM

## 2020-04-22 PROCEDURE — 36415 COLL VENOUS BLD VENIPUNCTURE: CPT

## 2020-04-22 PROCEDURE — 85027 COMPLETE CBC AUTOMATED: CPT

## 2020-04-22 PROCEDURE — 99285 EMERGENCY DEPT VISIT HI MDM: CPT

## 2020-04-22 PROCEDURE — 85379 FIBRIN DEGRADATION QUANT: CPT

## 2020-04-22 PROCEDURE — 80053 COMPREHEN METABOLIC PANEL: CPT

## 2020-04-22 PROCEDURE — 82728 ASSAY OF FERRITIN: CPT

## 2020-04-22 PROCEDURE — 86803 HEPATITIS C AB TEST: CPT

## 2020-04-22 PROCEDURE — 86140 C-REACTIVE PROTEIN: CPT

## 2020-04-22 PROCEDURE — 83615 LACTATE (LD) (LDH) ENZYME: CPT

## 2020-04-22 PROCEDURE — 71045 X-RAY EXAM CHEST 1 VIEW: CPT

## 2020-04-22 PROCEDURE — 80048 BASIC METABOLIC PNL TOTAL CA: CPT

## 2020-04-22 PROCEDURE — 84145 PROCALCITONIN (PCT): CPT

## 2020-04-22 PROCEDURE — T1013: CPT

## 2020-04-22 PROCEDURE — 83930 ASSAY OF BLOOD OSMOLALITY: CPT

## 2020-04-22 PROCEDURE — 83935 ASSAY OF URINE OSMOLALITY: CPT

## 2020-04-22 PROCEDURE — 84300 ASSAY OF URINE SODIUM: CPT

## 2020-04-22 PROCEDURE — 87635 SARS-COV-2 COVID-19 AMP PRB: CPT

## 2020-04-22 PROCEDURE — 93005 ELECTROCARDIOGRAM TRACING: CPT

## 2020-04-22 RX ORDER — ALBUTEROL 90 UG/1
2 AEROSOL, METERED ORAL
Qty: 1 | Refills: 0
Start: 2020-04-22

## 2020-04-22 RX ADMIN — SODIUM CHLORIDE 3 MILLILITER(S): 9 INJECTION INTRAMUSCULAR; INTRAVENOUS; SUBCUTANEOUS at 14:38

## 2020-04-22 RX ADMIN — SODIUM CHLORIDE 3 MILLILITER(S): 9 INJECTION INTRAMUSCULAR; INTRAVENOUS; SUBCUTANEOUS at 04:51

## 2020-04-22 RX ADMIN — ENOXAPARIN SODIUM 40 MILLIGRAM(S): 100 INJECTION SUBCUTANEOUS at 04:51

## 2020-04-22 NOTE — DISCHARGE NOTE NURSING/CASE MANAGEMENT/SOCIAL WORK - PATIENT PORTAL LINK FT
You can access the FollowMyHealth Patient Portal offered by [facility] by registering at the following website: [email or web address]. By joining [facility]’s FollowMyHealth portal, you will also be able to view your health information using other applications (apps) compatible with our system.

## 2020-04-22 NOTE — DISCHARGE NOTE PROVIDER - HOSPITAL COURSE
Pt is a 54 yo M c/o fever, cough, body aches and sob.  Pt states that his wife and mother-in-law were both covid positive.  In ED noted to have CXR with b/l intersitial infiltrates, COVID swab positive. Patient treated with Plaquenil and completed course.  Patient improved and O2 weaned off.  Patient stable for discharge to home.        Vital Signs Last 24 Hrs    T(C): 36.4 (22 Apr 2020 08:12), Max: 36.8 (21 Apr 2020 23:53)    T(F): 97.6 (22 Apr 2020 08:12), Max: 98.2 (21 Apr 2020 23:53)    HR: 61 (22 Apr 2020 08:12) (61 - 67)    BP: 98/60 (22 Apr 2020 08:12) (98/60 - 132/76)    BP(mean): --    RR: 19 (22 Apr 2020 08:12) (19 - 20)    SpO2: 96% (22 Apr 2020 08:12) (94% - 96%)        PHYSICAL EXAM:    GENERAL: NAD    HEAD:  Atraumatic, Normocephalic    NECK: Supple, No JVD, Normal thyroid    NERVOUS SYSTEM:  Alert & Oriented X3, Good concentration; Motor Strength 5/5 B/L upper and lower extremities    CHEST/LUNG: Clear to auscultation bilaterally; No rales, rhonchi, wheezing, or rubs    HEART: Regular rate and rhythm; No murmurs, rubs, or gallops    ABDOMEN: Soft, Nontender, Nondistended; Bowel sounds present    EXTREMITIES:  2+ Peripheral Pulses, No clubbing, cyanosis, or edema

## 2020-04-22 NOTE — CDI QUERY NOTE - NSCDIOTHERTXTBX_GEN_ALL_CORE_HH
Body Measurements:  ·    used  · Dosing Weight (KILOGRAMS)  113.4 kg  · Dosing Weight  (POUNDS)  250 Pound(s)  · How was the weight captured?  stated  · Height (FEET)  5 Feet  · Height (INCHES)  6 Inch(s)  · Height (CENTIMETERS)  167.64 Centimeter(s)  · Height type  stated  · BSA (m2)  2.2 Meter Squared  · BMI (kG/m2)  40.4  · Ideal Body Weight(kg)  64 kg    Please provide a diagnosis associated with high BMI if clinically significant    1) Morbid obesity with BMI 40.4  2) Obesity with BMI 40.4  3) Other  4) Not clinically significant

## 2020-04-22 NOTE — DISCHARGE NOTE PROVIDER - NSDCCPCAREPLAN_GEN_ALL_CORE_FT
PRINCIPAL DISCHARGE DIAGNOSIS  Diagnosis: COVID-19  Assessment and Plan of Treatment: Completed Plaquenil.      SECONDARY DISCHARGE DIAGNOSES  Diagnosis: Hyponatremia  Assessment and Plan of Treatment: Encourage oral fluids.

## 2020-04-22 NOTE — DISCHARGE NOTE PROVIDER - NSDCMRMEDTOKEN_GEN_ALL_CORE_FT
albuterol 90 mcg/inh inhalation aerosol: 2 puff(s) inhaled every 4 hours, As needed, Shortness of Breath and/or Wheezing  benzonatate 100 mg oral capsule: 1 cap(s) orally 3 times a day, As needed, Cough

## 2020-04-30 ENCOUNTER — TRANSCRIPTION ENCOUNTER (OUTPATIENT)
Age: 55
End: 2020-04-30

## 2020-05-01 ENCOUNTER — EMERGENCY (EMERGENCY)
Facility: HOSPITAL | Age: 55
LOS: 1 days | Discharge: HOME | End: 2020-05-01
Attending: EMERGENCY MEDICINE
Payer: COMMERCIAL

## 2020-05-01 VITALS
RESPIRATION RATE: 18 BRPM | HEIGHT: 65 IN | WEIGHT: 225.09 LBS | HEART RATE: 70 BPM | TEMPERATURE: 99 F | SYSTOLIC BLOOD PRESSURE: 105 MMHG | DIASTOLIC BLOOD PRESSURE: 67 MMHG

## 2020-05-01 PROBLEM — Z78.9 OTHER SPECIFIED HEALTH STATUS: Chronic | Status: ACTIVE | Noted: 2020-04-16

## 2020-05-01 LAB
ALBUMIN SERPL ELPH-MCNC: 3.8 G/DL — SIGNIFICANT CHANGE UP (ref 3.3–5.2)
ALP SERPL-CCNC: 83 U/L — SIGNIFICANT CHANGE UP (ref 40–120)
ALT FLD-CCNC: 43 U/L — HIGH
ANION GAP SERPL CALC-SCNC: 13 MMOL/L — SIGNIFICANT CHANGE UP (ref 5–17)
APTT BLD: 32.8 SEC — SIGNIFICANT CHANGE UP (ref 27.5–36.3)
AST SERPL-CCNC: 23 U/L — SIGNIFICANT CHANGE UP
BASOPHILS # BLD AUTO: 0.03 K/UL — SIGNIFICANT CHANGE UP (ref 0–0.2)
BASOPHILS NFR BLD AUTO: 0.5 % — SIGNIFICANT CHANGE UP (ref 0–2)
BILIRUB SERPL-MCNC: 0.6 MG/DL — SIGNIFICANT CHANGE UP (ref 0.4–2)
BUN SERPL-MCNC: 8 MG/DL — SIGNIFICANT CHANGE UP (ref 8–20)
CALCIUM SERPL-MCNC: 9 MG/DL — SIGNIFICANT CHANGE UP (ref 8.6–10.2)
CHLORIDE SERPL-SCNC: 100 MMOL/L — SIGNIFICANT CHANGE UP (ref 98–107)
CO2 SERPL-SCNC: 23 MMOL/L — SIGNIFICANT CHANGE UP (ref 22–29)
CREAT SERPL-MCNC: 0.56 MG/DL — SIGNIFICANT CHANGE UP (ref 0.5–1.3)
EOSINOPHIL # BLD AUTO: 0.19 K/UL — SIGNIFICANT CHANGE UP (ref 0–0.5)
EOSINOPHIL NFR BLD AUTO: 3.3 % — SIGNIFICANT CHANGE UP (ref 0–6)
GLUCOSE SERPL-MCNC: 84 MG/DL — SIGNIFICANT CHANGE UP (ref 70–99)
HCT VFR BLD CALC: 41 % — SIGNIFICANT CHANGE UP (ref 39–50)
HGB BLD-MCNC: 13.4 G/DL — SIGNIFICANT CHANGE UP (ref 13–17)
IMM GRANULOCYTES NFR BLD AUTO: 0.4 % — SIGNIFICANT CHANGE UP (ref 0–1.5)
INR BLD: 1.09 RATIO — SIGNIFICANT CHANGE UP (ref 0.88–1.16)
LYMPHOCYTES # BLD AUTO: 1.89 K/UL — SIGNIFICANT CHANGE UP (ref 1–3.3)
LYMPHOCYTES # BLD AUTO: 33.2 % — SIGNIFICANT CHANGE UP (ref 13–44)
MAGNESIUM SERPL-MCNC: 2.3 MG/DL — SIGNIFICANT CHANGE UP (ref 1.8–2.6)
MCHC RBC-ENTMCNC: 28.9 PG — SIGNIFICANT CHANGE UP (ref 27–34)
MCHC RBC-ENTMCNC: 32.7 GM/DL — SIGNIFICANT CHANGE UP (ref 32–36)
MCV RBC AUTO: 88.6 FL — SIGNIFICANT CHANGE UP (ref 80–100)
MONOCYTES # BLD AUTO: 0.48 K/UL — SIGNIFICANT CHANGE UP (ref 0–0.9)
MONOCYTES NFR BLD AUTO: 8.4 % — SIGNIFICANT CHANGE UP (ref 2–14)
NEUTROPHILS # BLD AUTO: 3.08 K/UL — SIGNIFICANT CHANGE UP (ref 1.8–7.4)
NEUTROPHILS NFR BLD AUTO: 54.2 % — SIGNIFICANT CHANGE UP (ref 43–77)
PHOSPHATE SERPL-MCNC: 4 MG/DL — SIGNIFICANT CHANGE UP (ref 2.4–4.7)
PLATELET # BLD AUTO: 515 K/UL — HIGH (ref 150–400)
POTASSIUM SERPL-MCNC: 4.5 MMOL/L — SIGNIFICANT CHANGE UP (ref 3.5–5.3)
POTASSIUM SERPL-SCNC: 4.5 MMOL/L — SIGNIFICANT CHANGE UP (ref 3.5–5.3)
PROT SERPL-MCNC: 7.1 G/DL — SIGNIFICANT CHANGE UP (ref 6.6–8.7)
PROTHROM AB SERPL-ACNC: 12.4 SEC — SIGNIFICANT CHANGE UP (ref 10–12.9)
RBC # BLD: 4.63 M/UL — SIGNIFICANT CHANGE UP (ref 4.2–5.8)
RBC # FLD: 13.7 % — SIGNIFICANT CHANGE UP (ref 10.3–14.5)
SODIUM SERPL-SCNC: 136 MMOL/L — SIGNIFICANT CHANGE UP (ref 135–145)
T3 SERPL-MCNC: 101 NG/DL — SIGNIFICANT CHANGE UP (ref 80–200)
T4 AB SER-ACNC: 8 UG/DL — SIGNIFICANT CHANGE UP (ref 4.5–12)
TSH SERPL-MCNC: 2.26 UIU/ML — SIGNIFICANT CHANGE UP (ref 0.27–4.2)
VIT B12 SERPL-MCNC: 370 PG/ML — SIGNIFICANT CHANGE UP (ref 232–1245)
WBC # BLD: 5.69 K/UL — SIGNIFICANT CHANGE UP (ref 3.8–10.5)
WBC # FLD AUTO: 5.69 K/UL — SIGNIFICANT CHANGE UP (ref 3.8–10.5)

## 2020-05-01 PROCEDURE — 99283 EMERGENCY DEPT VISIT LOW MDM: CPT

## 2020-05-01 PROCEDURE — 70450 CT HEAD/BRAIN W/O DYE: CPT | Mod: 26

## 2020-05-01 PROCEDURE — 99218: CPT

## 2020-05-01 PROCEDURE — 71045 X-RAY EXAM CHEST 1 VIEW: CPT | Mod: 26

## 2020-05-01 RX ORDER — SODIUM CHLORIDE 9 MG/ML
1000 INJECTION, SOLUTION INTRAVENOUS
Refills: 0 | Status: COMPLETED | OUTPATIENT
Start: 2020-05-01 | End: 2020-05-01

## 2020-05-01 RX ORDER — ALPRAZOLAM 0.25 MG
0.5 TABLET ORAL ONCE
Refills: 0 | Status: DISCONTINUED | OUTPATIENT
Start: 2020-05-01 | End: 2020-05-01

## 2020-05-01 RX ADMIN — SODIUM CHLORIDE 125 MILLILITER(S): 9 INJECTION, SOLUTION INTRAVENOUS at 23:15

## 2020-05-01 RX ADMIN — Medication 0.5 MILLIGRAM(S): at 21:44

## 2020-05-01 RX ADMIN — Medication 0.5 MILLIGRAM(S): at 15:44

## 2020-05-01 NOTE — ED PROVIDER NOTE - OBJECTIVE STATEMENT
Pt is a 56yo M with remote history of blood clots presenting with RUE tremor. He states that he had COVId and recovered on 4/23 and has been doing well since. He reports that over the last 3 nights he has had poor sleep. He notes that the shaking started in his R hand, then extended to his RUE, and now occasionally his RLE as well. He reports persistent involuntary tremor. Denies any pain, headache, dizziness, weakness, confusion, n/v/d.

## 2020-05-01 NOTE — ED CDU PROVIDER INITIAL DAY NOTE - CONSTITUTIONAL, MLM
normal... Well appearing, awake, alert, oriented to person, place, time/situation and in no apparent distress. occasional jerk movement as he talking

## 2020-05-01 NOTE — ED CDU PROVIDER INITIAL DAY NOTE - OBJECTIVE STATEMENT
54yo M with remote history of + covid with RUE tremor x 3 days in UE and LE and recovered on 4/23 and has been doing well since. He reports that over the last 3 nights he has had poor sleep. He notes that the shaking started in his R hand, then extended to his RUE, and now occasionally his RLE as well. He reports persistent involuntary tremor. Denies any pain fever , chills, abd pain , chest pain , fever , dizziness, , h.a , head traum . N/V/D   states at home he took his wife B1 that help for tremor , states he is social drinker , denies any using drugs or smoking

## 2020-05-01 NOTE — ED ADULT TRIAGE NOTE - CHIEF COMPLAINT QUOTE
c/o Dx COVID 4/19/2020, having difficulty sleeping, sent by MITCHELL MORALES for maybe something to calm him down, c/o tremors, not sleeping x 3 days

## 2020-05-01 NOTE — ED CDU PROVIDER INITIAL DAY NOTE - PHYSICAL EXAMINATION
Back: Spine midline and non-tender. No CVA tenderness.    	Skin: No rashes, abrasions, or lacerations.  Neuro: AO x 3. RUE tremulous. Finger to nose intact, worsening tremor on R. Gait normal with occasional RLE twitching. Strength spastic RUE. L sided extremities normal. CN II-XII intact.

## 2020-05-01 NOTE — ED STATDOCS - PROGRESS NOTE DETAILS
56 y/o M pt with significant PMHx of epilepsy COVID19 presents to the ED c/o tremors and trouble sleeping. Pt states he was here on April 17th for COVID and DC'd on the 23rd. He states that for the next 2 days he was symptom free, but now for the last 2 days he has been unable to sleep. He also notes tremors to his right side. Pt states he has a history of clots in his brain. Focused eval, protocol orders entered. Pt to be moved to main ED for further evaluation by another provider.

## 2020-05-01 NOTE — ED CDU PROVIDER INITIAL DAY NOTE - ATTENDING CONTRIBUTION TO CARE
Pt. presenting with Right upper arm tremor/twitching. No other deficit noted. Pt. awake and alert. Abdomen soft/NT. Sensation intact. Pt admitted to observation unit. Neurology following pt. I, Dr. Betts, performed a face to face bedside interview with this patient regarding history of present illness, review of symptoms and relevant past medical, social and family history.  I completed an independent physical examination.  I have also reviewed the ACP's note(s) and discussed the plan with the ACP.

## 2020-05-01 NOTE — ED PROVIDER NOTE - NS ED ROS FT
General: Denies fever, chills  HEENT: Denies sensory changes, sore throat  Neck: Denies neck pain, neck stiffness  Resp: Denies coughing, SOB  Cardiovascular: Denies CP, palpitations, LE edema  GI: Denies nausea, vomiting, abdominal pain, diarrhea, constipation, blood in stool  : Denies dysuria, hematuria, frequency, incontinence  MSK: Denies back pain  Neuro: Endorses tremor. Denies HA, dizziness, numbness, weakness  Skin: Denies rashes

## 2020-05-01 NOTE — ED CDU PROVIDER INITIAL DAY NOTE - MUSCULOSKELETAL, MLM
Spine appears normal, LE and UE strength grossly intact , gait normal speech normal with occasional RLE twitching

## 2020-05-01 NOTE — ED PROVIDER NOTE - PROGRESS NOTE DETAILS
Patient symptoms improved with xanax. After later reevaluation, patient symptoms returning. Will place in observation for further workup and evaluation with MRI.

## 2020-05-01 NOTE — ED ADULT NURSE NOTE - OBJECTIVE STATEMENT
Pt c/o tremors to the right side of his body and inability to sleep x's 3 days. Denies any pain, dizziness, headache, fever or other complaint.

## 2020-05-01 NOTE — ED PROVIDER NOTE - CLINICAL SUMMARY MEDICAL DECISION MAKING FREE TEXT BOX
Patient with worsening R sided tremor that is expanding in area of effect. Patient to be worked up with CTH and Neurology. Will treat with Xanax to relieve symptoms.

## 2020-05-01 NOTE — ED PROVIDER NOTE - PHYSICAL EXAMINATION
General: Well appearing male in no acute distress  HEENT: Normocephalic, atraumatic. Moist mucous membranes. Oropharynx clear. No lymphadenopathy.  Eyes: No scleral icterus. EOMI. TYREL.  Neck:. Soft and supple. Full ROM without pain. No midline tenderness  Cardiac: Regular rate and regular rhythm. No murmurs, rubs, gallops. Peripheral pulses 2+ and symmetric. No LE edema.  Resp: Lungs CTAB. Speaking in full sentences. No wheezes, rales or rhonchi.  Abd: Soft, non-tender, non-distended. Normal bowel sounds in all quadrants. No guarding or rebound. No scars, masses, or lesions.  Back: Spine midline and non-tender. No CVA tenderness.    Skin: No rashes, abrasions, or lacerations.  Neuro: AO x 3. RUE tremulous. Finger to nose intact, worsening tremor on R. Gait normal with occasional RLE twitching. Strength spastic RUE. L sided extremities normal. CN II-XII intact.

## 2020-05-01 NOTE — ED PROVIDER NOTE - ATTENDING CONTRIBUTION TO CARE
The patient seen and examined.    Tremor    I, Tre Hinton, performed the initial face to face bedside interview with this patient regarding history of present illness, review of symptoms and relevant past medical, social and family history.  I completed an independent physical examination.  I was the initial provider who evaluated this patient. I have signed out the follow up of any pending tests (i.e. labs, radiological studies) to the resident.  I have communicated the patient’s plan of care and disposition with the resident..

## 2020-05-01 NOTE — ED CDU PROVIDER INITIAL DAY NOTE - NEUROLOGICAL, MLM
AO x 3. tremulous. Finger to nose intact, worsening tremor on R. Gait normal with occasional RLE twitching. Strength spastic RUE. L sided extremities normal. CN II-XII intact.

## 2020-05-02 VITALS
TEMPERATURE: 98 F | DIASTOLIC BLOOD PRESSURE: 62 MMHG | HEART RATE: 78 BPM | OXYGEN SATURATION: 98 % | SYSTOLIC BLOOD PRESSURE: 120 MMHG | RESPIRATION RATE: 18 BRPM

## 2020-05-02 PROCEDURE — 84425 ASSAY OF VITAMIN B-1: CPT

## 2020-05-02 PROCEDURE — 82607 VITAMIN B-12: CPT

## 2020-05-02 PROCEDURE — 99284 EMERGENCY DEPT VISIT MOD MDM: CPT

## 2020-05-02 PROCEDURE — 85027 COMPLETE CBC AUTOMATED: CPT

## 2020-05-02 PROCEDURE — G0378: CPT

## 2020-05-02 PROCEDURE — 70450 CT HEAD/BRAIN W/O DYE: CPT

## 2020-05-02 PROCEDURE — 99217: CPT

## 2020-05-02 PROCEDURE — 84100 ASSAY OF PHOSPHORUS: CPT

## 2020-05-02 PROCEDURE — 84480 ASSAY TRIIODOTHYRONINE (T3): CPT

## 2020-05-02 PROCEDURE — 84443 ASSAY THYROID STIM HORMONE: CPT

## 2020-05-02 PROCEDURE — 85730 THROMBOPLASTIN TIME PARTIAL: CPT

## 2020-05-02 PROCEDURE — 85610 PROTHROMBIN TIME: CPT

## 2020-05-02 PROCEDURE — 70547 MR ANGIOGRAPHY NECK W/O DYE: CPT | Mod: 26

## 2020-05-02 PROCEDURE — 70551 MRI BRAIN STEM W/O DYE: CPT

## 2020-05-02 PROCEDURE — 83735 ASSAY OF MAGNESIUM: CPT

## 2020-05-02 PROCEDURE — 80053 COMPREHEN METABOLIC PANEL: CPT

## 2020-05-02 PROCEDURE — 70551 MRI BRAIN STEM W/O DYE: CPT | Mod: 26

## 2020-05-02 PROCEDURE — 71045 X-RAY EXAM CHEST 1 VIEW: CPT

## 2020-05-02 PROCEDURE — 70544 MR ANGIOGRAPHY HEAD W/O DYE: CPT | Mod: 26,59

## 2020-05-02 PROCEDURE — 84436 ASSAY OF TOTAL THYROXINE: CPT

## 2020-05-02 PROCEDURE — 70547 MR ANGIOGRAPHY NECK W/O DYE: CPT

## 2020-05-02 PROCEDURE — 70544 MR ANGIOGRAPHY HEAD W/O DYE: CPT

## 2020-05-02 PROCEDURE — 36415 COLL VENOUS BLD VENIPUNCTURE: CPT

## 2020-05-02 RX ORDER — DOXYLAMINE SUCCINATE 25 MG
1 TABLET ORAL
Qty: 7 | Refills: 0
Start: 2020-05-02 | End: 2020-05-08

## 2020-05-02 NOTE — ED CDU PROVIDER SUBSEQUENT DAY NOTE - HISTORY
54yo M with remote history of + covid with RUE tremor x 3 days in UE and LE and recovered on 4/23 and has been doing well since. He reports that over the last 3 nights he has had poor sleep. He notes that the shaking started in his R hand, then extended to his RUE, and now occasionally his RLE as well. pt is spelt over night well . No compliant from Nursing yet

## 2020-05-02 NOTE — ED CDU PROVIDER DISPOSITION NOTE - CARE PROVIDER_API CALL
Cornelius Strickland; PhD)  Neurosurgery  [address] [place] [address]  Phone: [phone]  Fax: [phone]  Follow Up Time:     Jeremias Montanez)  Neurology  [address] [facility] [address]  [place] [address]  Phone: [phone]  Fax: [phone]  Follow Up Time:

## 2020-05-02 NOTE — CONSULT NOTE ADULT - ASSESSMENT
55 year old male with no significant medical history prior to April 2020 when he was admitted to [facility] for covid19+ requiring noninvasive oxygen supplementation. Discharged 4/22.   Returns to [facility] 5/1 for RUE intention tremor that resolved temporarily with Xanax.   MRA with incidentally noted 3x2x2 posterolaterally facing right distal supraclinoid ICA aneurysm.   Neuro exam completely intact.  Imaging reviewed with patient.    No acute neurosurgical recommendations.  Discussed with Dr. Strickland.  Recommend follow up with Dr. Strickland and Dr. Orellana in 2 weeks outpatient.  Discussed strict blood pressure control with at-home monitoring with patient  Advised on s/s of intracranial hemorrhage and risks of hypertensive bleed  No contraindication to discharge from [facility] perspective

## 2020-05-02 NOTE — CONSULT NOTE ADULT - SUBJECTIVE AND OBJECTIVE BOX
HISTORY OF PRESENT ILLNESS:   Mr. Duran is a 55 year olf male who denies any past medical history who was recently admitted with covid19+ disease requiring noninvasive oxygen supplementation, discharged 4/22. He returns to the ED 5/1 with complaint of 3 days of RUE tremor that is relieved with Xanax and reinitiates with intention/purposeful movement. He denies any headache since being discharged from the hospital (headaches during covid19 disease process) and has otherwise been doing well. He denies vision changes, focal numbness or weakness of the limbs or face, dysphagia, dysarthria, aphasia, or hemisensory abnormalities.  He denies urinary symptoms.  His ED work up with MRI brain did not show infarct and his MRI had incidental finding of a 3 x 3 x 2 mm outpouching arising from the distal right supraclinoid ICA directed posteriorly and laterally, suggesting an aneurysm.   He reports he had a seizure 30 years ago after an anaphylactic reaction to something he ate but based on his story it appears this was attributed to the anaphylactic induced anoxia and no antiepileptics were recommended. He is unsure if he was told he had an aneurysm at that time but about 10 years ago he was told he had an aneurysm when he had an evaluation for neck pain, but that it was small. He denies tobacco use. He does not follow with a neurovascular specialist and he has no previous records from [facility] for ED visit or admission in our system. Again, he vehemently denies headache or neck pain recently or at the time of my evaluation. His blood pressure has remained controlled. His neuro exam is intact.     PAST MEDICAL & SURGICAL HISTORY:  covid19+ in April 2020  No significant past surgical history    FAMILY HISTORY:  None reported    SOCIAL HISTORY:  Denies tobacco use  Denies illicit drug use    Allergies  No Known Allergies    REVIEW OF SYSTEMS  12 pt ROS as per HPI    MEDICATIONS:  Denies    Vital Signs Last 24 Hrs  T(C): 36.9 (02 May 2020 14:08), Max: 37.1 (02 May 2020 12:48)  T(F): 98.4 (02 May 2020 14:08), Max: 98.8 (02 May 2020 12:48)  HR: 78 (02 May 2020 14:08) (60 - 83)  BP: 120/62 (02 May 2020 14:08) (109/65 - 121/80)  RR: 18 (02 May 2020 14:08) (18 - 20)  SpO2: 98% (02 May 2020 14:08) (97% - 98%)  PHYSICAL EXAM:  GENERAL: NAD, well-groomed, well-developed  HEAD:  Atraumatic, normocephalic  EYES: Conjunctiva and sclera clear  NECK: Supple, no JVD  MENTAL STATUS: AAO x3; Appropriately conversant without aphasia; following simple commands  CRANIAL NERVES: PERRL; EOMI; no facial asymmetry; facial sensation grossly intact to light touch b/l;  tongue midline  MOTOR: strength 5/5 B/L upper and lower extremities  COORDINATION: gait steady; rapid alternating movements intact b/l upper extremities; no upper extremity dysmetria, RUE low frequency, low amplitude intention tremor that appears somewhat distractible with left upper extremity activation to command  SENSATION: grossly intact to light touch all extremities  HEART: +S1/+S2; Regular rate and rhythm  ABDOMEN: Soft, nontender, nondistended; bowel sounds present all four quadrants  EXTREMITIES:  2+ peripheral pulses, no clubbing, cyanosis, or edema  SKIN: Warm, dry; no rashes or lesions    LABS:             13.4   5.69  )-----------( 515               41.0     136  |  100  |  8.0  ----------------------------<  84  4.5   |  23.0  |  0.56    Ca    9.0      01 May 2020 17:25  Phos  4.0     05-01  Mg     2.3     05-01    TPro  7.1  /  Alb  3.8  /  TBili  0.6  /  DBili  x   /  AST  23  /  ALT  43<H>  /  AlkPhos  83  05-01    PT/INR - ( 01 May 2020 15:44 )   PT: 12.4 sec;   INR: 1.09 ratio    PTT - ( 01 May 2020 15:44 )  PTT:32.8 sec    RADIOLOGY & ADDITIONAL STUDIES:  MR Angio Neck No Cont (05.02.20 @ 10:09)  Brain MRA:   1.  No large vessel occlusion.  2.  A 3 x 3 x 2 mm outpouching arising from the distal right supraclinoid ICA directed posteriorly and laterally, suggesting an aneurysm.     Neck MRA: No hemodynamically significant stenosis of the bilateral cervical ICAs by NASCET criteria.    MR Head No Cont (05.02.20 @ 09:38)  MPRESSION:   Brain MRI: No acute infarct.

## 2020-05-02 NOTE — ED CDU PROVIDER DISPOSITION NOTE - PATIENT PORTAL LINK FT
You can access the FollowMyHealth Patient Portal offered by [facility] by registering at the following website: [email or web address]. By joining [employer]’s FollowMyHealth portal, you will also be able to view your health information using other applications (apps) compatible with our system.

## 2020-05-02 NOTE — ED CDU PROVIDER SUBSEQUENT DAY NOTE - CONSTITUTIONAL, MLM
normal... Well appearing, awake, alert, oriented to person, place, time/situation and in no apparent distress. resting NAD

## 2020-05-02 NOTE — ED CDU PROVIDER DISPOSITION NOTE - CLINICAL COURSE
This is a 54yo M with remote history of blood clots presenting with RUE tremor. He states that he had COVID and recovered on 4/23 and has been doing well since. He reports that over the last 3 nights he has had poor sleep. He notes that the shaking started in his R hand, then extended to his RUE, and now occasionally his RLE as well. He reports persistent involuntary tremor. Denies any pain, headache, dizziness, weakness, confusion, n/v/d.  Patient placed in observation for MRA head and neck.  Revealed aneurysm.  Spoke to LESLY Granda, will advise patient to follow up with (neurosurgery) MD Peterson as outpatient in 2 weeks.  Spoke to MD Montanez (neurology) stable for outpatient follow up.  Patient given copies of all results.  Understands and agrees to proceed.

## 2020-05-02 NOTE — ED ADULT NURSE REASSESSMENT NOTE - NS ED NURSE REASSESS COMMENT FT1
Patient recd this morning, A/Ox3, VSS, eating breakfast at this time, minor tremor noticed right arm, will continue to monitor.
Patient verbalized understanding of discharge instructions, need for followup with PMD and/or specialist without fail.  Patient also provided with signs and symptoms to return to the emergency department immediately.  Patient A+Ox3, resps even and nonlabored, patient in no apparent distress.
pt laying in stretcher, no s/s of acute distress obs Pt sitting upright in stretcher in no apparent signs of distress. Pt status unchanged, refer to flowsheet and chart, pt safety maintained, pt hemodynamically stable, updated on plan of care. Call light provided, fall safety reinforced. Will continue to monitor
received report from seamus elizbaeth, pt laying in stretcher, no s/s of acute distress,pending dispo.

## 2020-05-02 NOTE — ED CDU PROVIDER SUBSEQUENT DAY NOTE - MEDICAL DECISION MAKING DETAILS
54yo M with history of + covid with RUE then RLE tremor x 3 and difficulty sleep , recovered on 4/23 and has been doing well since. He reports that over the last 3 nights he has had poor sleep. improved with B1 at home Po   added B1 and B12 level , NS  with additive , MR head , improved with benzo in ER ,  F.U neuro Dr galvin called by primary team, seizure precautions

## 2020-05-02 NOTE — ED CDU PROVIDER DISPOSITION NOTE - ATTENDING CONTRIBUTION TO CARE
in obs for tremor  eval sig aneurysm  cleared for dc by NS and will have outpt janis  agrees to call and make appt with NS 2 weeks  agree with dispo

## 2020-05-03 ENCOUNTER — TRANSCRIPTION ENCOUNTER (OUTPATIENT)
Age: 55
End: 2020-05-03

## 2020-05-04 LAB — VIT B1 SERPL-MCNC: 117.3 NMOL/L — SIGNIFICANT CHANGE UP (ref 66.5–200)

## 2020-10-10 ENCOUNTER — EMERGENCY (EMERGENCY)
Facility: HOSPITAL | Age: 55
LOS: 1 days | Discharge: HOME | End: 2020-10-10
Attending: EMERGENCY MEDICINE
Payer: COMMERCIAL

## 2020-10-10 VITALS
TEMPERATURE: 99 F | SYSTOLIC BLOOD PRESSURE: 128 MMHG | WEIGHT: 240.08 LBS | HEART RATE: 69 BPM | RESPIRATION RATE: 16 BRPM | HEIGHT: 66 IN | OXYGEN SATURATION: 94 % | DIASTOLIC BLOOD PRESSURE: 77 MMHG

## 2020-10-10 PROCEDURE — 99283 EMERGENCY DEPT VISIT LOW MDM: CPT

## 2020-10-10 PROCEDURE — T1013: CPT

## 2020-10-10 PROCEDURE — 73070 X-RAY EXAM OF ELBOW: CPT

## 2020-10-10 PROCEDURE — 73070 X-RAY EXAM OF ELBOW: CPT | Mod: 26,RT

## 2020-10-10 NOTE — ED STATDOCS - MUSCULOSKELETAL NEGATIVE STATEMENT, MLM
(+)right elbow swelling from trauma, no back pain, no gout, no musculoskeletal pain, no neck pain, and no weakness.

## 2020-10-10 NOTE — ED STATDOCS - NSFOLLOWUPCLINICS_GEN_ALL_ED_FT
[facility]  Orthopedics  [address] [place] [id number]  Phone: [phone]  Fax:   Follow Up Time: 4-6 Days

## 2020-10-10 NOTE — ED STATDOCS - ATTENDING CONTRIBUTION TO CARE
I, Dr. Betts, performed the initial face to face bedside interview with this patient regarding history of present illness, review of symptoms and relevant past medical, social and family history.  I completed an independent physical examination.  I was the initial provider who evaluated this patient. I have signed out the follow up of any pending tests (i.e. labs, radiological studies) to the ACP.  I have communicated the patient’s plan of care and disposition with the ACP.

## 2020-10-10 NOTE — ED STATDOCS - PROGRESS NOTE DETAILS
Pt moved form intake Room. Pt seen and evaluated by intake Physician. HPI, Physical examination performed by intake Physician . Note reviewed and followup examination performed by me consistent with initial assessment. Agrees with intake Physician plan and tests. Pt with right elbow swelling , No deformity noted, No ecchymosis. X-ray ordered and will evaluate post imaging. X-ray + Olecranon spur and bursa noted. placed in ace wrap and F/U Orthopedic. Pt D/C in stable condition.

## 2020-10-10 NOTE — ED STATDOCS - CARE PLAN
Principal Discharge DX:	Olecranon bursitis of right elbow  Secondary Diagnosis:	Olecranon bone spur

## 2020-10-10 NOTE — ED STATDOCS - MUSCULOSKELETAL FINDINGS, MLM
(+)small area of fluctuance and ecchymosis on right elbow, good ROM, good distal pulse noted, sensations intact, no tenderness to forearm or upper arm./normal range of motion/motor intact

## 2020-10-10 NOTE — ED STATDOCS - PATIENT PORTAL LINK FT
You can access the FollowMyHealth Patient Portal offered by [facility] by registering at the following website: [email or web address]. By joining [employer]’s FollowMyHealth portal, you will also be able to view your health information using other applications (apps) compatible with our system.

## 2020-10-10 NOTE — ED ADULT NURSE NOTE - OBJECTIVE STATEMENT
swelling to right elbow status post door closing on it, hematoma to elbow, neuros intact, good radial and ulnar pulses, cap refill brisk, no numbness reported.

## 2020-10-10 NOTE — ED STATDOCS - OBJECTIVE STATEMENT
55y male pt with pmhx of presents to ED c/o of an injury to the elbow after he hit it on a wooden door about 2 hours ago. Pt states an hour after he hit his elbow, he felt a bulge in his elbow, which now feels numb and is swollen. Pt also reports he does not feel pain in his arm.     pt denies any LOC  : Octavia

## 2021-03-04 NOTE — ED STATDOCS - INTERNATIONAL TRAVEL
Botox Injection Note     3/4/2021     Patient:  Tierra Cotton   YOB: 1962  Date of Visit: 3/4/2021      Indication: patient has chronic migraine headaches greater than 15 days per month lasting more than 4 hours each. She has failed or not tolerated 2 or more medication preventatives. Written consent was signed and verified by me. Risks and benefits were discussed to include possible cosmetic asymmetry which is not permament or life threatening. Patient name and  was confirmed prior to procedure. Time out performed. Procedure:   Botox concentration: 200 units in 2 ml of preservative-free normal saline. 1:1 dilution. LOT#: [id number]  EXP:  2023    Injections and distribution as follow :      Units/site  Sites Loc Subtotal    procerus 5 1 ML 5   corrugaters 2.5 2 BL 5   frontalis 5 8 BL 40   Temporalis 10 4 BL 40   Occipitalis 10 2 BL 20   Cervical paraspinals 5 4 BL 20   Trapezius 10 4 BL 40         200 units Botox were reconstituted, 170 units injected as above and the remainder was unavoidably wasted. Patient tolerated procedure well. Return in 3 months for repeat injections.     _____________________________   Enoch Rinaldi DO  [address], ABPN No

## 2021-03-31 ENCOUNTER — APPOINTMENT (OUTPATIENT)
Dept: DISASTER EMERGENCY | Facility: OTHER | Age: 56
End: 2021-03-31

## 2021-04-28 ENCOUNTER — APPOINTMENT (OUTPATIENT)
Dept: DISASTER EMERGENCY | Facility: OTHER | Age: 56
End: 2021-04-28
Payer: MEDICAID

## 2021-04-28 PROCEDURE — 0012A: CPT

## 2021-07-05 ENCOUNTER — EMERGENCY (EMERGENCY)
Facility: HOSPITAL | Age: 56
LOS: 1 days | Discharge: HOME | End: 2021-07-05
Attending: EMERGENCY MEDICINE
Payer: COMMERCIAL

## 2021-07-05 VITALS
SYSTOLIC BLOOD PRESSURE: 141 MMHG | HEART RATE: 74 BPM | DIASTOLIC BLOOD PRESSURE: 89 MMHG | RESPIRATION RATE: 16 BRPM | HEIGHT: 66 IN | TEMPERATURE: 98 F | OXYGEN SATURATION: 95 %

## 2021-07-05 PROCEDURE — 99283 EMERGENCY DEPT VISIT LOW MDM: CPT

## 2021-07-05 PROCEDURE — 29550 STRAPPING OF TOES: CPT | Mod: RT

## 2021-07-05 PROCEDURE — 99283 EMERGENCY DEPT VISIT LOW MDM: CPT | Mod: 25

## 2021-07-05 PROCEDURE — 73630 X-RAY EXAM OF FOOT: CPT | Mod: 26,RT

## 2021-07-05 PROCEDURE — 73630 X-RAY EXAM OF FOOT: CPT

## 2021-07-05 NOTE — ED PROVIDER NOTE - CARE PROVIDER_API CALL
Andrew Mackey (DPM)  Podiatric Medicine and Surgery  [address] [place] [address]  Phone: [phone]  Fax: [phone]  Follow Up Time:

## 2021-07-05 NOTE — ED PROVIDER NOTE - CARE PLAN
Principal Discharge DX:	Closed nondisplaced fracture of middle phalanx of lesser toe of right foot, initial encounter

## 2021-07-05 NOTE — ED PROVIDER NOTE - OBJECTIVE STATEMENT
Patient is a 56 year old male who presents c/o right 2nd toe pain. patient states last night was walking and stubbed his toe on something on ground.   Via  patient states did not step on anything.  patient locates pain to 2nd toe

## 2021-07-05 NOTE — ED ADULT TRIAGE NOTE - CHIEF COMPLAINT QUOTE
pt comes to ed from home for eval of right 2nd toe. states he stepped on something yesterday unsure what it was but is having pain and swelling to right 2nd toe. patient denies any medical problems.

## 2021-07-05 NOTE — ED PROVIDER NOTE - ATTENDING CONTRIBUTION TO CARE
hospital  (radha): stubbed toe last night. reports pain since.  TTP right 2nd toe without gross deformity.  Xray: minimally displaced 2nd prox phalange fracture.  A/P toe fracture:  body tape and hard-soled shoe

## 2021-07-05 NOTE — ED PROVIDER NOTE - PATIENT PORTAL LINK FT
You can access the FollowMyHealth Patient Portal offered by [facility] by registering at the following website: [email or web address]. By joining [email or web address]’s FollowMyHealth portal, you will also be able to view your health information using other applications (apps) compatible with our system.

## 2022-01-04 ENCOUNTER — APPOINTMENT (OUTPATIENT)
Dept: PULMONOLOGY | Facility: CLINIC | Age: 57
End: 2022-01-04

## 2022-01-14 NOTE — ED PROVIDER NOTE - OBSERVING MD:
Sky Betts Consent (Scalp)/Introductory Paragraph: The rationale for Mohs was explained to the patient and consent was obtained. The risks, benefits and alternatives to therapy were discussed in detail. Specifically, the risks of changes in hair growth pattern secondary to repair, infection, scarring, bleeding, prolonged wound healing, incomplete removal, allergy to anesthesia, nerve injury and recurrence were addressed. Prior to the procedure, the treatment site was clearly identified and confirmed by the patient. All components of Universal Protocol/PAUSE Rule completed.

## 2022-01-17 ENCOUNTER — TRANSCRIPTION ENCOUNTER (OUTPATIENT)
Age: 57
End: 2022-01-17

## 2022-03-23 ENCOUNTER — APPOINTMENT (OUTPATIENT)
Dept: NEUROLOGY | Facility: CLINIC | Age: 57
End: 2022-03-23

## 2022-08-12 NOTE — ED CDU PROVIDER SUBSEQUENT DAY NOTE - CARDIOVASCULAR NEGATIVE STATEMENT, MLM
"Pt reported drinking \"a lot\" of whiskey.  Pt was  to  for 1 month after meeting in alcohol treatment.  " no chest pain and no edema.

## 2023-01-17 NOTE — ED STATDOCS - PRO INTERPRETER NEED 2
"    1/17/2023         RE: Karolina Dietrich  [address]  [facility] [address]        Dear Colleague,    Thank you for referring your patient, Karolina Dietrich, to the [facility]. Please see a copy of my visit note below.       Gastroenterology CLINIC VISIT, NEW PATIENT    CC/REFERRING PROVIDER: Yoni Fletcher  REASON FOR CONSULTATION: chronic constipation    HPI: 30 year old male was referred to GI clinic for consultation on chronic constipation.  Patient reported ongoing issues with \"constipation\" for many years, but it got worse in the past few months.  Patient stated that as a [employer], he needs to have clean body prior to a prayer.  Complains that he has lots of bloating and sensation of incomplete evacuation every day. He reported spending approximately one hour in the bathroom to empty his bowels and to get rid of gas to be clean.  Complains of left-sided abdominal discomfort prior to defecation and straining.  He describes his stools as soft and small most of the times. Sometimes,they are firm or formed but still small.  No black stools or blood per rectum.   He eats 3 small meals because his symptoms get worse after a large meal. Admits to drinking less than 8 cups of fluids a day.  He stopped consuming certain foods that cause excessive flatulence and bloating such as broccoli, banana, beans, cabbage, kale, barley, and wheat.  Describes symptoms of lactose intolerance.  He was screened negative for celiac disease back in 2018, when he had similar symptoms. Said that he also had colonoscopy in a different state at that time.  No health records are available to review, but patient stated he was told to avoid spicy foods.  Patient denies acid reflux, swallowing problems, nausea, vomiting, or significant changes in his weight.  Patient does not take any medications.  He was prescribed MiraLAX by another provider, but he only tried it 1 or 2 times and discontinue the medication " thinking it is not working.  He denies smoking, alcohol use, or drug use.  Denies family history of GI malignancy or IBD.      ROS: 10pt ROS performed and otherwise negative.    PAST MEDICAL HISTORY:  No past medical history on file.    PREVIOUS ABDOMINAL/GYNECOLOGIC SURGERIES:  No past surgical history on file.      PERTINENT MEDICATIONS:  Current Outpatient Medications   Medication Sig Dispense Refill     psyllium (METAMUCIL/KONSYL) 58.6 % powder Take 15 g by mouth daily Start with 4-5 gram (one teaspoon) with supper. Slowly increase the dose over a few weeks to 15 gram or one tablespoon. 660 g 3     simethicone (MYLICON) 125 MG chewable tablet Take 1 tablet (125 mg) by mouth 2 times daily Take it before a meal for bloating 60 tablet 3     polyethylene glycol (MIRALAX) 17 GM/Dose powder Take 17 g (1 capful) by mouth daily (Patient not taking: Reported on 1/17/2023) 578 g 0     No other OTC/herbal/supplements reported by patient.    SOCIAL HISTORY:  Social History     Socioeconomic History     Marital status: Single     Spouse name: Not on file     Number of children: Not on file     Years of education: Not on file     Highest education level: Not on file   Occupational History     Not on file   Tobacco Use     Smoking status: Never     Smokeless tobacco: Never   Substance and Sexual Activity     Alcohol use: Not on file     Drug use: Not on file     Sexual activity: Not on file   Other Topics Concern     Not on file   Social History Narrative     Not on file     Social Determinants of Health     Financial Resource Strain: Not on file   Food Insecurity: Not on file   Transportation Needs: Not on file   Physical Activity: Not on file   Stress: Not on file   Social Connections: Not on file   Intimate Partner Violence: Not on file   Housing Stability: Not on file       FAMILY HISTORY:  Denies colon/panc/esophageal/other GI CA, no other Ansari or other HPS-related Alejandro. No IBD/celiac, no other AI/liver/thyroid  "disease.    PHYSICAL EXAMINATION:  Vitals reviewed  /64   Ht 1.854 m (6' 1\")   Wt 62.8 kg (138 lb 6.4 oz)   BMI 18.26 kg/m      General: Patient appears well in no acute distress.   Skin: No visualized rash or lesions on visualized skin  Eyes: EOMI, no erythema, sclera icterus or discharge noted  Resp: breathing comfortably without accessory muscle usage, speaking in full sentences, no cough  Lung sounds clear  Card: Regular and rhythmic S1 and S2. No murmur,gallop, or rub  Abdomen: Active bowel sounds X 4 quadrants. Soft to palpation, no guarding or rebound tenderness   MSK: Appears to have normal range of motion based on visualized movements  Neurologic: No apparent tremors, facial movements symmetric  Psych: affect normal, alert and oriented      PERTINENT STUDIES Reviewed in EMR    ASSESSMENT/PLAN:  30 year old male  presented to GI clinic for a consultation on chronic constipation, but the patient's symptoms are more consistent with functional dyspepsia or IBS-C.  Patient has documentated negative screening for celiac disease in 2018. He also mentioned having colonoscopy about 10 years ago, but it was done in another state. No records available to review.  Recent x-ray of abdomen showed moderate stool burden in the colon.Laboratory work was unremarkable-normal CMP and CBC with exception of mild leukocytopenia (WBC at 3.0).  We had a long discussion on possible causes of constipation and on correlation between constipation and reduced food intake, insufficient dietary fiber, exercise, and fluid intake.  Suggested to increase water and food intake. Do not skip meals.  I recommended to restart MiraLAX at 9 to 10 g in the morning and to gradually increase the dose to 17 g a day.  Explained to the patient that it could take a few days MiraLAX to start working.  Recommended a trial of Metamucil, again starting with a small dose of one teaspoon and gradually tapering it up to one tablespoon.  Simethicone was " order to take as needed for bloating.  If no improvement in symptoms in 3 months, will proceed with upper and lower GI endoscopy and will work on management of altered stool pattern.      ICD-10-CM    1. Abdominal bloating  R14.0 simethicone (MYLICON) 125 MG chewable tablet      2. Chronic constipation  K59.09 Adult GI  Referral - Consult Only     psyllium (METAMUCIL/KONSYL) 58.6 % powder        RTC in 3 months    Thank you for this consultation. It was a pleasure to participate in the care of this patient; please contact us with any further questions.    RAMONA Caal, MICHELLEP-C  [facility]  Gastroenterology Department  [place], [place]    This note was created with Dragon voice recognition software, and while reviewed for accuracy, inadvertent minor typographic errors may occur. Please contact the provider if you have any questions.        Again, thank you for allowing me to participate in the care of your patient.        Sincerely,        RAMONA CAAL CNP     [place]

## 2024-05-08 ENCOUNTER — NON-APPOINTMENT (OUTPATIENT)
Age: 59
End: 2024-05-08

## 2024-07-10 ENCOUNTER — OUTPATIENT (OUTPATIENT)
Dept: OUTPATIENT SERVICES | Facility: HOSPITAL | Age: 59
LOS: 1 days | End: 2024-07-10
Payer: COMMERCIAL

## 2024-07-10 DIAGNOSIS — G47.33 OBSTRUCTIVE SLEEP APNEA (ADULT) (PEDIATRIC): ICD-10-CM

## 2024-07-10 PROCEDURE — 95810 POLYSOM 6/> YRS 4/> PARAM: CPT | Mod: 26

## 2024-07-10 PROCEDURE — 95810 POLYSOM 6/> YRS 4/> PARAM: CPT

## 2024-07-18 ENCOUNTER — APPOINTMENT (OUTPATIENT)
Dept: PULMONOLOGY | Facility: CLINIC | Age: 59
End: 2024-07-18
Payer: COMMERCIAL

## 2024-07-18 VITALS
WEIGHT: 237 LBS | RESPIRATION RATE: 16 BRPM | DIASTOLIC BLOOD PRESSURE: 78 MMHG | BODY MASS INDEX: 38.09 KG/M2 | OXYGEN SATURATION: 95 % | SYSTOLIC BLOOD PRESSURE: 118 MMHG | HEART RATE: 68 BPM | HEIGHT: 66 IN

## 2024-07-18 DIAGNOSIS — G47.33 OBSTRUCTIVE SLEEP APNEA (ADULT) (PEDIATRIC): ICD-10-CM

## 2024-07-18 DIAGNOSIS — Z71.89 OTHER SPECIFIED COUNSELING: ICD-10-CM

## 2024-07-18 PROCEDURE — G2211 COMPLEX E/M VISIT ADD ON: CPT

## 2024-07-18 PROCEDURE — 99205 OFFICE O/P NEW HI 60 MIN: CPT

## 2024-08-18 NOTE — ED ADULT TRIAGE NOTE - TEMPERATURE IN CELSIUS (DEGREES C)
[Medication Risks Reviewed] : Medication risks reviewed [Surgical risks reviewed] : Surgical risks reviewed [de-identified] : The patient's condition is acute  Confounding medical conditions/concerns: hx of high cholesterol, hx of knee arthroscopy, hx of kidney stones , patella subluxation  Tests/Studies Independently Interpreted Today: xray of the L knee revealed moderate  PF OA ------------------------------------------------------------------------------------------------------------------   Discussed risks of surgical treatment and nonsurgical treatment of arthritis, discussed risks of steroid injection plus or minus Visco supplementation, risks of Zilretta and benefits, role of surgery and MRI, risks and role of NSAIDs and side effects, benefits of therapy. The patient is aware and understands that if he fails all conservative treatment modalities, he should consider a total knee arthroplasty. In the interim, we will focus on conservative management of arthritic related pain.   Discussed treatment options including Zilretta inj for the left knee. Pt would like to proceed with L knee Zilretta inj today.   Discussed treatment options in the form of injection therapy. Patient elected to receive L knee Zilretta under ultrasound guidance. Advised patient to rest and ice the area.   The risks, benefits and contents of the injection have been discussed. Risks include but are not limited to allergic reaction, flare reaction, permanent white skin discoloration at the injection site and infection.  The patient understands the risks and agrees to having the injection.  All questions have been answered.   Discussed the timing of the injections and the follow up that is needed. Advised the patient to ice the area that was injected and that it may take a few days before experiencing relief.  f/u in 4 weeks if pain persist   I, Jennifer Alberts, attest that this documentation has been prepared under the direction and in the presence of Provider Dr. Franklyn Craig  37.1

## 2024-09-12 ENCOUNTER — APPOINTMENT (OUTPATIENT)
Dept: PULMONOLOGY | Facility: CLINIC | Age: 59
End: 2024-09-12

## 2024-09-18 NOTE — ED PROCEDURE NOTE - NS ED PERI VASCULAR NEG
oral
fingers/toes warm to touch/no cyanosis of extremity/capillary refill time < 2 seconds/no paresthesia

## 2024-09-26 ENCOUNTER — APPOINTMENT (OUTPATIENT)
Dept: PULMONOLOGY | Facility: CLINIC | Age: 59
End: 2024-09-26
Payer: COMMERCIAL

## 2024-09-26 VITALS
SYSTOLIC BLOOD PRESSURE: 118 MMHG | RESPIRATION RATE: 16 BRPM | HEART RATE: 78 BPM | BODY MASS INDEX: 39.21 KG/M2 | WEIGHT: 244 LBS | HEIGHT: 66 IN | OXYGEN SATURATION: 95 % | DIASTOLIC BLOOD PRESSURE: 78 MMHG

## 2024-09-26 DIAGNOSIS — G47.33 OBSTRUCTIVE SLEEP APNEA (ADULT) (PEDIATRIC): ICD-10-CM

## 2024-09-26 DIAGNOSIS — Z71.89 OTHER SPECIFIED COUNSELING: ICD-10-CM

## 2024-09-26 PROCEDURE — 99214 OFFICE O/P EST MOD 30 MIN: CPT

## 2024-09-26 PROCEDURE — G2211 COMPLEX E/M VISIT ADD ON: CPT

## 2024-10-01 NOTE — REASON FOR VISIT
[Initial] : an initial visit [Sleep Apnea] : sleep apnea [Ad Hoc ] : provided by an ad hoc  [Interpreters_FullName] : Anika Saeed [Interpreters_Relationshiptopatient] : MA [TWNoteComboBox1] : [place]

## 2024-10-01 NOTE — CONSULT LETTER
[Dear  ___] : Dear  [unfilled], [Consult Letter:] : I had the pleasure of evaluating your patient, [unfilled]. [Please see my note below.] : Please see my note below. [Consult Closing:] : Thank you very much for allowing me to participate in the care of this patient.  If you have any questions, please do not hesitate to contact me. [Sincerely,] : Sincerely, [FreeTextEntry3] : Buddy Hines MD FCCP Pulmonary/Critical Care/Sleep Medicine Department of Internal Medicine  [facility]

## 2024-10-01 NOTE — HISTORY OF PRESENT ILLNESS
[Obstructive Sleep Apnea] : obstructive sleep apnea [Awakes Unrefreshed] : awakes unrefreshed [Awakes with Dry Mouth] : awakes with dry mouth [Snoring] : snoring [Witnessed Apneas] : witnessed apneas [Lab] : lab [Awakes with Headache] : does not awaken with headache [TextBox_77] : 0000 [TextBox_79] : 9334 [TextBox_81] : 5 [TextBox_89] : 0 [TextBox_100] : 7/10/2024 [TextBox_108] : 52 [TextBox_112] : 38% [TextBox_116] : 61

## 2024-10-01 NOTE — DISCUSSION/SUMMARY
[Obstructive Sleep Apnea] : obstructive sleep apnea [Severe] : severe [None] : There are no changes in medication management [Alcohol Avoidance] : alcohol avoidance [Sedative Avoidance] : sedative avoidance [Weight Loss Program] : weight loss program [CPAP] : CPAP [Patient] : discussed with the patient [de-identified] : CPAP was initiated with full explanation of the physiology behind treatment, compliance requirements and care of equipment.

## 2024-11-05 NOTE — DISCHARGE NOTE PROVIDER - NSDCCPGOAL_GEN_ALL_CORE_FT
Left a message for the patient to contact me back to confirm his appointment with Dr. Perez for 11/11/24 at 1400.  
To get better and follow your care plan as instructed.

## 2025-02-04 NOTE — ED STATDOCS - NSFOLLOWUPINSTRUCTIONS_ED_ALL_ED_FT
Alert and oriented to person, place and time
Continue with OTC pain medication  Ace wrap applied and Pt will continue with ace wrap and ice packs  F/U with Orthopedic clinic.